# Patient Record
Sex: MALE | Race: WHITE | HISPANIC OR LATINO | Employment: UNEMPLOYED | URBAN - METROPOLITAN AREA
[De-identification: names, ages, dates, MRNs, and addresses within clinical notes are randomized per-mention and may not be internally consistent; named-entity substitution may affect disease eponyms.]

---
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
## 2025-02-13 ENCOUNTER — HOSPITAL ENCOUNTER (INPATIENT)
Facility: HOSPITAL | Age: 20
LOS: 3 days | Discharge: HOME | End: 2025-02-17
Attending: EMERGENCY MEDICINE | Admitting: STUDENT IN AN ORGANIZED HEALTH CARE EDUCATION/TRAINING PROGRAM
Payer: COMMERCIAL

## 2025-02-13 DIAGNOSIS — R42 DIZZINESS: ICD-10-CM

## 2025-02-13 DIAGNOSIS — J93.9 PNEUMOTHORAX ON RIGHT: Primary | ICD-10-CM

## 2025-02-13 DIAGNOSIS — M54.9 BACK PAIN: ICD-10-CM

## 2025-02-13 DIAGNOSIS — R07.9 CHEST PAIN: ICD-10-CM

## 2025-02-13 PROCEDURE — 99285 EMERGENCY DEPT VISIT HI MDM: CPT

## 2025-02-13 PROCEDURE — 93005 ELECTROCARDIOGRAM TRACING: CPT

## 2025-02-13 NOTE — LETTER
[facility] [address]  [address]  [place] [address]  Dept: [phone]    February 17, 2025     Patient: Jayesh Hatfield Jr.   YOB: 2005   Date of Visit: 2/13/2025       To Whom it May Concern:    Jayesh Hatfield is under my professional care. He was seen in the hospital from 2/13/2025 to 02/17/25. He may return to work on 2/19 with the following limitations no heavy lifting greater than 20 pounds for 2 weeks or unless cleared sooner by PCP .    If you have any questions or concerns, please don't hesitate to call.         Sincerely,          Ai Francis MD

## 2025-02-14 ENCOUNTER — APPOINTMENT (EMERGENCY)
Dept: RADIOLOGY | Facility: HOSPITAL | Age: 20
End: 2025-02-14
Payer: COMMERCIAL

## 2025-02-14 PROBLEM — D72.829 LEUKOCYTOSIS: Status: ACTIVE | Noted: 2025-02-14

## 2025-02-14 PROBLEM — J93.83 SPONTANEOUS PNEUMOTHORAX: Status: ACTIVE | Noted: 2025-02-14

## 2025-02-14 PROBLEM — Z72.0 VAPES NICOTINE CONTAINING SUBSTANCE: Status: ACTIVE | Noted: 2025-02-14

## 2025-02-14 PROBLEM — R65.10 SIRS (SYSTEMIC INFLAMMATORY RESPONSE SYNDROME) (HCC): Status: ACTIVE | Noted: 2025-02-14

## 2025-02-14 LAB
ALBUMIN SERPL BCG-MCNC: 5.1 G/DL (ref 3.5–5)
ALP SERPL-CCNC: 88 U/L (ref 34–104)
ALT SERPL W P-5'-P-CCNC: 13 U/L (ref 7–52)
ANION GAP SERPL CALCULATED.3IONS-SCNC: 7 MMOL/L (ref 4–13)
AST SERPL W P-5'-P-CCNC: 17 U/L (ref 13–39)
BASOPHILS # BLD AUTO: 0.04 THOUSANDS/ΜL (ref 0–0.1)
BASOPHILS NFR BLD AUTO: 0 % (ref 0–1)
BILIRUB SERPL-MCNC: 0.51 MG/DL (ref 0.2–1)
BUN SERPL-MCNC: 12 MG/DL (ref 5–25)
CALCIUM SERPL-MCNC: 9.9 MG/DL (ref 8.4–10.2)
CARDIAC TROPONIN I PNL SERPL HS: <2 NG/L (ref ?–50)
CHLORIDE SERPL-SCNC: 102 MMOL/L (ref 96–108)
CO2 SERPL-SCNC: 29 MMOL/L (ref 21–32)
CREAT SERPL-MCNC: 0.79 MG/DL (ref 0.6–1.3)
D DIMER PPP FEU-MCNC: <0.27 UG/ML FEU
EOSINOPHIL # BLD AUTO: 0.07 THOUSAND/ΜL (ref 0–0.61)
EOSINOPHIL NFR BLD AUTO: 1 % (ref 0–6)
ERYTHROCYTE [DISTWIDTH] IN BLOOD BY AUTOMATED COUNT: 11.7 % (ref 11.6–15.1)
GFR SERPL CREATININE-BSD FRML MDRD: 127 ML/MIN/1.73SQ M
GLUCOSE SERPL-MCNC: 90 MG/DL (ref 65–140)
HCT VFR BLD AUTO: 44 % (ref 36.5–49.3)
HGB BLD-MCNC: 15.5 G/DL (ref 12–17)
IMM GRANULOCYTES # BLD AUTO: 0.05 THOUSAND/UL (ref 0–0.2)
IMM GRANULOCYTES NFR BLD AUTO: 0 % (ref 0–2)
LIPASE SERPL-CCNC: 7 U/L (ref 11–82)
LYMPHOCYTES # BLD AUTO: 1.52 THOUSANDS/ΜL (ref 0.6–4.47)
LYMPHOCYTES NFR BLD AUTO: 12 % (ref 14–44)
MCH RBC QN AUTO: 30.7 PG (ref 26.8–34.3)
MCHC RBC AUTO-ENTMCNC: 35.2 G/DL (ref 31.4–37.4)
MCV RBC AUTO: 87 FL (ref 82–98)
MONOCYTES # BLD AUTO: 0.65 THOUSAND/ΜL (ref 0.17–1.22)
MONOCYTES NFR BLD AUTO: 5 % (ref 4–12)
NEUTROPHILS # BLD AUTO: 10.42 THOUSANDS/ΜL (ref 1.85–7.62)
NEUTS SEG NFR BLD AUTO: 82 % (ref 43–75)
NRBC BLD AUTO-RTO: 0 /100 WBCS
PLATELET # BLD AUTO: 187 THOUSANDS/UL (ref 149–390)
PMV BLD AUTO: 10.6 FL (ref 8.9–12.7)
POTASSIUM SERPL-SCNC: 3.5 MMOL/L (ref 3.5–5.3)
PROT SERPL-MCNC: 7.2 G/DL (ref 6.4–8.4)
RBC # BLD AUTO: 5.05 MILLION/UL (ref 3.88–5.62)
SODIUM SERPL-SCNC: 138 MMOL/L (ref 135–147)
WBC # BLD AUTO: 12.75 THOUSAND/UL (ref 4.31–10.16)

## 2025-02-14 PROCEDURE — 83690 ASSAY OF LIPASE: CPT

## 2025-02-14 PROCEDURE — 85025 COMPLETE CBC W/AUTO DIFF WBC: CPT

## 2025-02-14 PROCEDURE — 0W9930Z DRAINAGE OF RIGHT PLEURAL CAVITY WITH DRAINAGE DEVICE, PERCUTANEOUS APPROACH: ICD-10-PCS

## 2025-02-14 PROCEDURE — 99223 1ST HOSP IP/OBS HIGH 75: CPT | Performed by: STUDENT IN AN ORGANIZED HEALTH CARE EDUCATION/TRAINING PROGRAM

## 2025-02-14 PROCEDURE — 96374 THER/PROPH/DIAG INJ IV PUSH: CPT

## 2025-02-14 PROCEDURE — 32551 INSERTION OF CHEST TUBE: CPT | Performed by: EMERGENCY MEDICINE

## 2025-02-14 PROCEDURE — 36415 COLL VENOUS BLD VENIPUNCTURE: CPT

## 2025-02-14 PROCEDURE — 99152 MOD SED SAME PHYS/QHP 5/>YRS: CPT | Performed by: EMERGENCY MEDICINE

## 2025-02-14 PROCEDURE — 99285 EMERGENCY DEPT VISIT HI MDM: CPT | Performed by: EMERGENCY MEDICINE

## 2025-02-14 PROCEDURE — 85379 FIBRIN DEGRADATION QUANT: CPT

## 2025-02-14 PROCEDURE — 84484 ASSAY OF TROPONIN QUANT: CPT

## 2025-02-14 PROCEDURE — 80053 COMPREHEN METABOLIC PANEL: CPT

## 2025-02-14 PROCEDURE — 99232 SBSQ HOSP IP/OBS MODERATE 35: CPT | Performed by: PHYSICIAN ASSISTANT

## 2025-02-14 PROCEDURE — 71045 X-RAY EXAM CHEST 1 VIEW: CPT

## 2025-02-14 PROCEDURE — 99222 1ST HOSP IP/OBS MODERATE 55: CPT | Performed by: THORACIC SURGERY (CARDIOTHORACIC VASCULAR SURGERY)

## 2025-02-14 PROCEDURE — 71046 X-RAY EXAM CHEST 2 VIEWS: CPT

## 2025-02-14 RX ORDER — HYDROMORPHONE HCL/PF 1 MG/ML
0.5 SYRINGE (ML) INJECTION ONCE
Status: COMPLETED | OUTPATIENT
Start: 2025-02-14 | End: 2025-02-14

## 2025-02-14 RX ORDER — OXYCODONE HYDROCHLORIDE 5 MG/1
5 TABLET ORAL EVERY 4 HOURS PRN
Refills: 0 | Status: DISCONTINUED | OUTPATIENT
Start: 2025-02-14 | End: 2025-02-17 | Stop reason: HOSPADM

## 2025-02-14 RX ORDER — LIDOCAINE HYDROCHLORIDE AND EPINEPHRINE 10; 10 MG/ML; UG/ML
1 INJECTION, SOLUTION INFILTRATION; PERINEURAL ONCE
Status: COMPLETED | OUTPATIENT
Start: 2025-02-14 | End: 2025-02-14

## 2025-02-14 RX ORDER — HYDROMORPHONE HCL/PF 1 MG/ML
0.5 SYRINGE (ML) INJECTION ONCE
Refills: 0 | Status: COMPLETED | OUTPATIENT
Start: 2025-02-14 | End: 2025-02-14

## 2025-02-14 RX ORDER — HYDROMORPHONE HCL IN WATER/PF 6 MG/30 ML
0.2 PATIENT CONTROLLED ANALGESIA SYRINGE INTRAVENOUS EVERY 4 HOURS PRN
Status: DISCONTINUED | OUTPATIENT
Start: 2025-02-14 | End: 2025-02-17 | Stop reason: HOSPADM

## 2025-02-14 RX ORDER — KETAMINE HYDROCHLORIDE 50 MG/ML
2 INJECTION, SOLUTION INTRAMUSCULAR; INTRAVENOUS ONCE
Status: COMPLETED | OUTPATIENT
Start: 2025-02-14 | End: 2025-02-14

## 2025-02-14 RX ORDER — HEPARIN SODIUM 5000 [USP'U]/ML
5000 INJECTION, SOLUTION INTRAVENOUS; SUBCUTANEOUS EVERY 8 HOURS SCHEDULED
Status: DISCONTINUED | OUTPATIENT
Start: 2025-02-14 | End: 2025-02-17 | Stop reason: HOSPADM

## 2025-02-14 RX ADMIN — OXYCODONE HYDROCHLORIDE 5 MG: 5 TABLET ORAL at 10:41

## 2025-02-14 RX ADMIN — LIDOCAINE HYDROCHLORIDE,EPINEPHRINE BITARTRATE 1 ML: 10; .01 INJECTION, SOLUTION INFILTRATION; PERINEURAL at 02:45

## 2025-02-14 RX ADMIN — KETAMINE HYDROCHLORIDE 55 MG: 50 INJECTION INTRAMUSCULAR; INTRAVENOUS at 02:42

## 2025-02-14 RX ADMIN — HYDROMORPHONE HYDROCHLORIDE 0.2 MG: 0.2 INJECTION, SOLUTION INTRAMUSCULAR; INTRAVENOUS; SUBCUTANEOUS at 12:10

## 2025-02-14 RX ADMIN — HYDROMORPHONE HYDROCHLORIDE 0.5 MG: 1 INJECTION, SOLUTION INTRAMUSCULAR; INTRAVENOUS; SUBCUTANEOUS at 04:46

## 2025-02-14 RX ADMIN — HYDROMORPHONE HYDROCHLORIDE 0.5 MG: 1 INJECTION, SOLUTION INTRAMUSCULAR; INTRAVENOUS; SUBCUTANEOUS at 03:25

## 2025-02-14 RX ADMIN — HEPARIN SODIUM 5000 UNITS: 5000 INJECTION INTRAVENOUS; SUBCUTANEOUS at 22:00

## 2025-02-14 RX ADMIN — OXYCODONE HYDROCHLORIDE 5 MG: 5 TABLET ORAL at 22:00

## 2025-02-14 RX ADMIN — OXYCODONE HYDROCHLORIDE 5 MG: 5 TABLET ORAL at 15:30

## 2025-02-14 RX ADMIN — HYDROMORPHONE HYDROCHLORIDE 0.2 MG: 0.2 INJECTION, SOLUTION INTRAMUSCULAR; INTRAVENOUS; SUBCUTANEOUS at 18:58

## 2025-02-14 NOTE — PROGRESS NOTES
Progress Note - Hospitalist   Name: Jayesh Hatfield Jr. 20 y.o. male I MRN: [id number]  Unit/Bed#: ED 06 I Date of Admission: 2/13/2025   Date of Service: 2/14/2025 I Hospital Day: 0    Assessment & Plan  Spontaneous pneumothorax  No significant past medical history presenting with right-sided chest pain that radiated to the back around 9:30 PM  Chest x-ray showed right-sided pneumothorax for which successful chest tube was placed by ED with follow-up chest x-ray shows lung reexpansion.  Thoracic surgery following and appreciate their input.  Maintain chest tube to suction for now unless walking in halls.  Possible transition to water seal tomorrow.  Continuous O2 monitor  Vapes nicotine containing substance  Encourage cessation  SIRS (systemic inflammatory response syndrome) (HCC)  As evidenced by tachycardia HR 98, WBC 12.75  Tachycardia resolved.    Check CBC in AM  Likely reactive related to spontaneous pneumothorax  Monitor off antibiotics    VTE Pharmacologic Prophylaxis: VTE Score: 0 Low Risk (Score 0-2) - Encourage Ambulation.    Patient Centered Rounds: I performed bedside rounds with nursing staff today.   Discussions with Specialists or Other Care Team Provider: None    Education and Discussions with Family / Patient: Updated  (mother) at bedside.    Current Length of Stay: 0 day(s)  Current Patient Status: Inpatient   Certification Statement: The patient will continue to require additional inpatient hospital stay due to continued need for chest tube  Discharge Plan: Anticipate discharge in 24-48 hrs to home.    Code Status: Level 1 - Full Code    Subjective   Patient is seen and evaluated in the ED.  He states his breathing is much improved and he barely has any further chest pain with inspiration.      Objective :  Temp:  [98.2 °F (36.8 °C)] 98.2 °F (36.8 °C)  HR:  [55-98] 60  BP: (100-155)/() 129/86  Resp:  [12-22] 13  SpO2:  [86 %-100 %] 100 %  O2 Device: None (Room air)  Nasal Cannula  O2 Flow Rate (L/min):  [2 L/min] 2 L/min    There is no height or weight on file to calculate BMI.     Input and Output Summary (last 24 hours):   No intake or output data in the 24 hours ending 02/14/25 1438    Physical Exam  Constitutional:       General: He is not in acute distress.     Appearance: Normal appearance. He is normal weight.   HENT:      Head: Normocephalic and atraumatic.      Mouth/Throat:      Mouth: Mucous membranes are moist.   Eyes:      General: No scleral icterus.     Extraocular Movements: Extraocular movements intact.   Cardiovascular:      Rate and Rhythm: Normal rate and regular rhythm.      Heart sounds: No murmur heard.  Pulmonary:      Effort: Pulmonary effort is normal. No respiratory distress.      Breath sounds: Normal breath sounds. No wheezing, rhonchi or rales.      Comments: Right-sided chest tube in place.  Abdominal:      General: Bowel sounds are normal.      Palpations: Abdomen is soft.      Tenderness: There is no abdominal tenderness.   Musculoskeletal:         General: Normal range of motion.      Cervical back: Normal range of motion.   Skin:     General: Skin is warm.      Findings: No rash.   Neurological:      General: No focal deficit present.      Mental Status: He is alert and oriented to person, place, and time.   Psychiatric:         Mood and Affect: Mood normal.         Behavior: Behavior normal.       Lines/Drains:  Lines/Drains/Airways       Active Status       Name Placement date Placement time Site Days    Chest Tube 1 Right Midaxillary 16 Fr. 02/14/25  0343  Midaxillary  less than 1                    Lab Results: I have reviewed the following results:   Results from last 7 days   Lab Units 02/14/25  0103   WBC Thousand/uL 12.75*   HEMOGLOBIN g/dL 15.5   HEMATOCRIT % 44.0   PLATELETS Thousands/uL 187   SEGS PCT % 82*   LYMPHO PCT % 12*   MONO PCT % 5   EOS PCT % 1     Results from last 7 days   Lab Units 02/14/25  0103   SODIUM mmol/L 138   POTASSIUM mmol/L  3.5   CHLORIDE mmol/L 102   CO2 mmol/L 29   BUN mg/dL 12   CREATININE mg/dL 0.79   ANION GAP mmol/L 7   CALCIUM mg/dL 9.9   ALBUMIN g/dL 5.1*   TOTAL BILIRUBIN mg/dL 0.51   ALK PHOS U/L 88   ALT U/L 13   AST U/L 17   GLUCOSE RANDOM mg/dL 90     Imaging Results Review: I reviewed radiology reports from this admission including: chest xray.  Other Study Results Review: No additional pertinent studies reviewed.    Last 24 Hours Medication List:     Current Facility-Administered Medications:     HYDROmorphone HCl (DILAUDID) injection 0.2 mg, Q4H PRN    oxyCODONE (ROXICODONE) IR tablet 5 mg, Q4H PRN    oxyCODONE (ROXICODONE) split tablet 2.5 mg, Q4H PRN    Administrative Statements   Today, Patient Was Seen By: Annie Hu PA-C    **Please Note: This note may have been constructed using a voice recognition system.**

## 2025-02-14 NOTE — ASSESSMENT & PLAN NOTE
As evidenced by tachycardia HR 98, WBC 12.75  Likely reactive related to spontaneous pneumothorax  Monitor off antibiotics

## 2025-02-14 NOTE — ASSESSMENT & PLAN NOTE
No significant past medical history presenting with right-sided chest pain that radiated to the back around 9:30 PM  Chest x-ray showed right-sided pneumothorax for which successful chest tube was placed by ED with follow-up chest x-ray shows lung reexpansion.  Thoracic surgery following and appreciate their input.  Maintain chest tube to suction for now unless walking in halls.  Possible transition to water seal tomorrow.  Continuous O2 monitor

## 2025-02-14 NOTE — ED ATTENDING ATTESTATION
2/13/2025  I, Leila Teresa MD, saw and evaluated the patient. I have discussed the patient with the resident/non-physician practitioner and agree with the resident's/non-physician practitioner's findings, Plan of Care, and MDM as documented in the resident's/non-physician practitioner's note, except where noted. All available labs and Radiology studies were reviewed.  I was present for key portions of any procedure(s) performed by the resident/non-physician practitioner and I was immediately available to provide assistance.       At this point I agree with the current assessment done in the Emergency Department.  I have conducted an independent evaluation of this patient a history and physical is as follows:  Right-sided back pain that started suddenly this evening, radiating to back beginning at 9:30 PM.  Much worse with inhalation.  Did have an episode of dizziness but that resolved.  No fevers.  No chills.  No significant cough.  On exam patient with tachycardia.  Chest x-ray with pneumothorax.  Chest tube placed by Seldinger technique.  Will admit to the hospital for spontaneous pneumothorax.  ED Course         Critical Care Time  Procedures

## 2025-02-14 NOTE — ASSESSMENT & PLAN NOTE
No significant past medical history presenting with right-sided chest pain that radiated to the back around 9:30 PM  Chest x-ray showed right-sided pneumothorax for which successful chest tube was placed by ED with follow-up chest x-ray shows lung reexpansion  Unclear etiology at this time  ED discussed with thoracic surgery who will follow as consult  Continuous O2 monitor

## 2025-02-14 NOTE — CONSULTS
Consultation - Thoracic    Name: Jayesh Hatfield Jr. 20 y.o. male I MRN: [id number]  Unit/Bed#: ED 06 I Date of Admission: 2/13/2025   Date of Service: 2/14/2025 I Hospital Day: 0   Inpatient consult to Thoracic Surgery  Consult performed by: Oleg Gonzalez MD  Consult ordered by: Adela Mckeon DO        Physician Requesting Evaluation: Adela Mckeon DO   Reason for Evaluation / Principal Problem: spontaneous R ptx    Assessment & Plan  Spontaneous pneumothorax  -sudden onset R chest pain yesterday at work, no prior episodes, R ptx on CXR and ED placed R CT overnight w resolution of R ptx on subsequent CXR  -R CT to -20 wall suction, small air leak to cough and valsalva, we will keep CT to suction for today, no output  -incentive spirometry, deep breathing and coughing, respiratory protocol  -discussed w patient and mom, will trial treatment w chest tube and when air leak resolves and we pull the tube, if lung is up on CXR and he feels well it would be reasonable to trial non operative management, however if air leak does not resolve, he has continued air leak, or this recurs down the road, will have to pursue R VATS, may require CT imaging before that, will discuss w Dr Felix Bello nicotine containing substance  -has been using vapes daily for 2-3 years  -also states he has a medical marijuana card and smokes         History of Present Illness   Jayesh Hatfield Jr. is a 20 y.o. male w insignificant PMH or surgical hx, no medications. Patient presents to [facility] ED w right chest pain and discomfort, found to have right pneumothorax, R chest tube was placed by ED with resolution of ptx. Thoracic surgery consulted for assistance w management.     Patient reports he was working at the [employer] yesterday morning / afternoon when he has sudden onset of right sided chest pain radiating to his back, especially w deep breathing and inhalation. He reports the pain worsened throughout the course of the day and  ultimately he became lightheaded and dizzy and so he presented to the ED. Denies previous episodes of similar. He does report smoking vapes daily for 2-3 years, also has medical marijuana card and smokes with that.     Review of Systems   Constitutional:  Negative for appetite change, chills and fever.   Respiratory:  Positive for shortness of breath.    Cardiovascular:  Positive for chest pain.   Gastrointestinal:  Negative for abdominal distention, abdominal pain, constipation, diarrhea, nausea and vomiting.   Genitourinary:  Negative for difficulty urinating.   Musculoskeletal:  Positive for back pain. Negative for neck pain.   Skin:  Negative for color change.   Neurological:  Positive for dizziness and light-headedness. Negative for syncope and headaches.   Psychiatric/Behavioral:  Negative for agitation and confusion. The patient is not nervous/anxious.    All other systems reviewed and are negative.    Medical History Review: I have reviewed the patient's PMH, PSH, Social History, Family History, Meds, and Allergies     Objective :  Temp:  [98.2 °F (36.8 °C)] 98.2 °F (36.8 °C)  HR:  [55-98] 76  BP: (100-155)/() 100/73  Resp:  [12-22] 16  SpO2:  [86 %-100 %] 100 %  O2 Device: None (Room air)  Nasal Cannula O2 Flow Rate (L/min):  [2 L/min] 2 L/min    I/O       None          Lines/Drains/Airways       Active Status       Name Placement date Placement time Site Days    Chest Tube 1 Right Midaxillary 16 Fr. 02/14/25  0343  Midaxillary  less than 1                  Physical Exam  Vitals reviewed.   Constitutional:       General: He is not in acute distress.     Appearance: Normal appearance. He is not ill-appearing or toxic-appearing.   HENT:      Head: Normocephalic and atraumatic.      Nose: Nose normal.      Mouth/Throat:      Mouth: Mucous membranes are moist.   Eyes:      Extraocular Movements: Extraocular movements intact.   Cardiovascular:      Rate and Rhythm: Normal rate and regular rhythm.       Pulses: Normal pulses.   Pulmonary:      Effort: Pulmonary effort is normal. No respiratory distress.      Comments: R CT to suction, small air leak to cough and valsalva   Abdominal:      General: There is no distension.      Palpations: Abdomen is soft.      Tenderness: There is no abdominal tenderness.   Musculoskeletal:         General: Normal range of motion.      Cervical back: Normal range of motion.   Skin:     General: Skin is warm.      Capillary Refill: Capillary refill takes less than 2 seconds.      Coloration: Skin is not jaundiced or pale.   Neurological:      Mental Status: He is alert and oriented to person, place, and time.   Psychiatric:         Mood and Affect: Mood normal.            Lab Results: I have reviewed the following results:  Recent Labs     02/14/25  0103 02/14/25  0402   WBC 12.75*  --    HGB 15.5  --    HCT 44.0  --      --    SODIUM 138  --    K 3.5  --      --    CO2 29  --    BUN 12  --    CREATININE 0.79  --    GLUC 90  --    AST 17  --    ALT 13  --    ALB 5.1*  --    TBILI 0.51  --    ALKPHOS 88  --    HSTNI0 <2  --    HSTNI2  --  <2

## 2025-02-14 NOTE — ASSESSMENT & PLAN NOTE
-has been using vapes daily for 2-3 years  -also states he has a medical marijuana card and smokes

## 2025-02-14 NOTE — ED PROVIDER NOTES
Time reflects when diagnosis was documented in both MDM as applicable and the Disposition within this note       Time User Action Codes Description Comment    2/14/2025  1:16 AM Jacques Duarte Add [R07.9] Chest pain     2/14/2025  1:16 AM Jacques Duarte Add [M54.9] Back pain     2/14/2025  1:16 AM Jacques Duarte Add [R42] Dizziness     2/14/2025  3:26 AM Jacques Duarte Add [J93.9] Pneumothorax on right     2/14/2025  3:26 AM Jacques Duarte Modify [R07.9] Chest pain     2/14/2025  3:26 AM Jacques Duarte Modify [J93.9] Pneumothorax on right           ED Disposition       ED Disposition   Admit    Condition   Stable    Date/Time   Fri Feb 14, 2025  3:26 AM    Comment   --             Assessment & Plan       Medical Decision Making  Amount and/or Complexity of Data Reviewed  Labs: ordered. Decision-making details documented in ED Course.  Radiology: ordered and independent interpretation performed.  ECG/medicine tests:  Decision-making details documented in ED Course.    Risk  Prescription drug management.  Decision regarding hospitalization.      Patient is a 20 y.o. male with a past medical history of asthma presenting for right-sided chest pain that radiates straight through to the back that began around 9:30 PM with an episode of dizziness around 10 PM.      Vital signs stable. On exam decreased breath sounds in the right lung fields, chest tenderness is unable to be reproduced with palpation.    History and physical exam most consistent with right-sided pneumothorax. However, differential diagnosis included but not limited to acute coronary syndrome, pulmonary embolism, pneumonia, anemia, electrolyte abnormality, acute kidney injury, pancreatitis, arrhythmia.     Plan: CBC, CMP, troponin, D-dimer, EKG, CXR    Initial CXR showed a moderate to large sized spontaneous pneumothorax, no tracheal deviation.  Decision was made to consent patient for procedural sedation and chest tube insertion to relieve the air in the thoracic  "cavity that is compressing the lung.    View ED course for further discussion on patient workup.    All labs reviewed and utilized in the medical decision making process  All radiology studies independently viewed by me and interpreted by the radiologist.  I reviewed all testing with the patient.     Upon re-evaluation patient tolerated procedure well with no immediate postprocedure complications.  Repeat chest x-ray shows reexpansion of the right lung with chest tube in place.  Patient had 1 brief episode of hypoxia during procedure and recovered quickly with bag valve mask ventilation.  Patient mildly hypoxic at 93% on room air after the procedure; placed on 2 L nasal cannula. The cause of patient's spontaneous pneumothorax is unclear.  According to patient's social history, he is a vape user which increases his risk of pneumothorax.  Patient's pneumothorax is unlikely traumatic as there is no reported recent trauma and no tracheal deviation on CXR.  Patient also states that he recently had a viral URI or possibly a pneumonia as he had described recent cough and congestion symptoms for which he has been feeling better lately.  Asthma is also a risk factor for pneumothorax; it is possible that patient's recent illness of cough and congestion may have exacerbated his asthma; thereby increasing his risk.  It does not appear patient has a history of pneumothorax.  Cause is unknown.  Will admit patient to [facility].    Disposition: Admitted to [facility] for right-sided pneumothorax with chest tube placement and thoracic surgery consult.    Portions of the record may have been created with voice recognition software. Occasional wrong word or \"sound a like\" substitutions may have occurred due to the inherent limitations of voice recognition software. Read the chart carefully and recognize, using context, where substitutions have occurred.    ED Course as of 02/14/25 1446   Fri Feb 14, 2025   0057 ECG 12 lead  Rapid rate, regular " rhythm, normal axis.  P waves present.  No obvious ST segment abnormalities.  Possible left ventricular hypertrophy given amplitude of QRS complexes in some leads.  EKG interpreted as sinus tachycardia.   0117 WBC(!): 12.75   0148 Consents obtained for chest tube placement and procedural sedation at this time.   0157 D-Dimer, Quant: <0.27  Lower suspicion for pulmonary embolism   0157 LIPASE(!): 7  Lower suspicion for pancreatitis   0158 hs TnI 0hr: <2   0158 Comprehensive metabolic panel(!)  Stable   0500 hs TnI 2hr: <2  Low suspicion for acute coronary syndrome       Medications   ketamine (Ketalar) injection 110 mg (55 mg Intravenous Given 2/14/25 0242)   lidocaine-epinephrine (XYLOCAINE/EPINEPHRINE) 1 %-1:100,000 injection 1 mL (1 mL Infiltration Given by Other 2/14/25 0245)   HYDROmorphone (DILAUDID) injection 0.5 mg (0.5 mg Intravenous Given 2/14/25 0325)       ED Risk Strat Scores            HEART Risk Score      Flowsheet Row Most Recent Value   Heart Score Risk Calculator    History 0 Filed at: 02/14/2025 0501   ECG 0 Filed at: 02/14/2025 0501   Age 0 Filed at: 02/14/2025 0501   Risk Factors 0 Filed at: 02/14/2025 0501   Troponin 0 Filed at: 02/14/2025 0501   HEART Score 0 Filed at: 02/14/2025 0501                                             History of Present Illness       Chief Complaint   Patient presents with    Chest Pain     Pt. C/o left sided chest pain during inspiration that started today around 1500.        Past Medical History:   Diagnosis Date    Pneumonia     Trichophytosis 10/2010    R PINNA    Vitamin D deficiency     resolved 9/15/17      Past Surgical History:   Procedure Laterality Date    CIRCUMCISION      elective      Family History   Problem Relation Age of Onset    Gestational diabetes Mother     Asthma Father     Diabetes Maternal Grandmother     Hypertension Maternal Grandmother     Hyperlipidemia Maternal Grandmother     Diabetes Paternal Grandmother     Hypertension Paternal  Grandmother     Asthma Paternal Grandfather     Diabetes Paternal Grandfather     Asthma Other     Asthma Cousin       Social History     Tobacco Use    Smoking status: Never    Smokeless tobacco: Never      E-Cigarette/Vaping      E-Cigarette/Vaping Substances      I have reviewed and agree with the history as documented.       Chest Pain  Associated symptoms: back pain and dizziness      Patient is a 20-year-old male with a past medical history of asthma presenting for low right-sided chest pain that radiates straight through to the back that began around 9:30 PM.  Patient states that he does not experience the chest pain at rest but it presents itself when he inhales and seems to travel straight through to his back.  He also experienced an episode of dizziness around 10 PM but did not syncopize.  Patient states he recently had a viral URI which he is just now starting to feel better.  Patient denies family history of sudden cardiac death, fevers, chills, shortness of breath, hemoptysis, nausea, vomiting, constipation, and diarrhea.    Review of Systems   Cardiovascular:  Positive for chest pain.   Musculoskeletal:  Positive for back pain.   Neurological:  Positive for dizziness.   All other systems reviewed and are negative.          Objective       ED Triage Vitals [02/13/25 2309]   Temperature Pulse Blood Pressure Respirations SpO2 Patient Position - Orthostatic VS   98.2 °F (36.8 °C) 98 116/68 18 99 % Sitting      Temp Source Heart Rate Source BP Location FiO2 (%) Pain Score    Temporal Monitor Left arm -- 5      Vitals      Date and Time Temp Pulse SpO2 Resp BP Pain Score FACES Pain Rating User   02/14/25 0320 -- 75 95 % 22 140/102 10 - Worst Possible Pain -- AS   02/14/25 0315 -- 73 97 % 14 136/90 10 - Worst Possible Pain -- AS   02/14/25 0310 -- 63 100 % 15 145/85 -- -- AS   02/14/25 0305 -- 63 100 % 19 155/96 -- -- AS   02/14/25 0300 -- 65 97 % 14 145/97 -- -- AS   02/14/25 0255 -- 88 95 % 17 154/98 -- --  AS   02/14/25 0250 -- 96 100 % 14 130/75 -- -- AS   02/14/25 0245 -- 90 86 % 12 131/85 -- -- AS   02/14/25 0240 -- 76 100 % 19 114/59 -- -- AS   02/14/25 0225 -- 81 100 % 13 119/62 -- -- AS   02/14/25 0215 -- 76 100 % 16 132/73 -- -- AS   02/13/25 2309 98.2 °F (36.8 °C) 98 99 % 18 116/68 5 -- BK            Physical Exam  Vitals and nursing note reviewed.   Constitutional:       General: He is not in acute distress.     Appearance: Normal appearance. He is well-developed. He is not ill-appearing or toxic-appearing.   HENT:      Head: Normocephalic and atraumatic.   Eyes:      General: No scleral icterus.        Right eye: No discharge.         Left eye: No discharge.      Extraocular Movements: Extraocular movements intact.      Conjunctiva/sclera: Conjunctivae normal.      Pupils: Pupils are equal, round, and reactive to light.   Cardiovascular:      Rate and Rhythm: Normal rate and regular rhythm.      Pulses: Normal pulses.      Heart sounds: Normal heart sounds. No murmur heard.  Pulmonary:      Effort: Pulmonary effort is normal. No respiratory distress.      Breath sounds: No stridor. Examination of the right-upper field reveals decreased breath sounds. Examination of the right-lower field reveals decreased breath sounds. Decreased breath sounds present. No wheezing, rhonchi or rales.   Chest:      Chest wall: No tenderness.   Abdominal:      General: Bowel sounds are normal. There is no distension.      Palpations: Abdomen is soft.      Tenderness: There is no abdominal tenderness. There is no right CVA tenderness, left CVA tenderness, guarding or rebound. Negative signs include Spencer's sign and McBurney's sign.   Musculoskeletal:         General: No swelling.      Cervical back: Normal range of motion and neck supple.      Thoracic back: No tenderness.      Right lower leg: No edema.      Left lower leg: No edema.   Skin:     General: Skin is warm and dry.      Capillary Refill: Capillary refill takes less  than 2 seconds.      Coloration: Skin is not jaundiced.      Findings: No erythema.   Neurological:      General: No focal deficit present.      Mental Status: He is alert and oriented to person, place, and time.      Cranial Nerves: Cranial nerves 2-12 are intact. No cranial nerve deficit.      Sensory: Sensation is intact. No sensory deficit.      Motor: Motor function is intact. No weakness.   Psychiatric:         Mood and Affect: Mood normal.         Behavior: Behavior normal.         Thought Content: Thought content normal.         Judgment: Judgment normal.         Results Reviewed       Procedure Component Value Units Date/Time    HS Troponin I 2hr [[id number]] Collected: 02/14/25 0402    Lab Status: In process Specimen: Blood from Arm, Left Updated: 02/14/25 0405    HS Troponin I 4hr [[id number]]     Lab Status: No result Specimen: Blood     Comprehensive metabolic panel [[id number]]  (Abnormal) Collected: 02/14/25 0103    Lab Status: Final result Specimen: Blood from Arm, Right Updated: 02/14/25 0133     Sodium 138 mmol/L      Potassium 3.5 mmol/L      Chloride 102 mmol/L      CO2 29 mmol/L      ANION GAP 7 mmol/L      BUN 12 mg/dL      Creatinine 0.79 mg/dL      Glucose 90 mg/dL      Calcium 9.9 mg/dL      AST 17 U/L      ALT 13 U/L      Alkaline Phosphatase 88 U/L      Total Protein 7.2 g/dL      Albumin 5.1 g/dL      Total Bilirubin 0.51 mg/dL      eGFR 127 ml/min/1.73sq m     Narrative:      National Kid[employer] guidelines for Chronic Kidney Disease (CKD):     Stage 1 with normal or high GFR (GFR > 90 mL/min/1.73 square meters)    Stage 2 Mild CKD (GFR = 60-89 mL/min/1.73 square meters)    Stage 3A Moderate CKD (GFR = 45-59 mL/min/1.73 square meters)    Stage 3B Moderate CKD (GFR = 30-44 mL/min/1.73 square meters)    Stage 4 Severe CKD (GFR = 15-29 mL/min/1.73 square meters)    Stage 5 End Stage CKD (GFR <15 mL/min/1.73 square meters)  Note: GFR calculation is accurate only with a steady  state creatinine    Lipase [[id number]]  (Abnormal) Collected: 02/14/25 0103    Lab Status: Final result Specimen: Blood from Arm, Right Updated: 02/14/25 0133     Lipase 7 u/L     HS Troponin 0hr (reflex protocol) [[id number]]  (Normal) Collected: 02/14/25 0103    Lab Status: Final result Specimen: Blood from Arm, Right Updated: 02/14/25 0131     hs TnI 0hr <2 ng/L     D-Dimer [[id number]]  (Normal) Collected: 02/14/25 0103    Lab Status: Final result Specimen: Blood from Arm, Right Updated: 02/14/25 0130     D-Dimer, Quant <0.27 ug/ml FEU     CBC and differential [[id number]]  (Abnormal) Collected: 02/14/25 0103    Lab Status: Final result Specimen: Blood from Arm, Right Updated: 02/14/25 0113     WBC 12.75 Thousand/uL      RBC 5.05 Million/uL      Hemoglobin 15.5 g/dL      Hematocrit 44.0 %      MCV 87 fL      MCH 30.7 pg      MCHC 35.2 g/dL      RDW 11.7 %      MPV 10.6 fL      Platelets 187 Thousands/uL      nRBC 0 /100 WBCs      Segmented % 82 %      Immature Grans % 0 %      Lymphocytes % 12 %      Monocytes % 5 %      Eosinophils Relative 1 %      Basophils Relative 0 %      Absolute Neutrophils 10.42 Thousands/µL      Absolute Immature Grans 0.05 Thousand/uL      Absolute Lymphocytes 1.52 Thousands/µL      Absolute Monocytes 0.65 Thousand/µL      Eosinophils Absolute 0.07 Thousand/µL      Basophils Absolute 0.04 Thousands/µL             XR chest 2 views   ED Interpretation by Jacques Duarte DO (02/14 0129)   Right-sided pneumothorax      XR chest 1 view portable    (Results Pending)       Chest Tube    Date/Time: 2/14/2025 3:33 AM    Performed by: Jacques Duarte DO  Authorized by: Jacques Duarte DO    Patient location:  ED  Other Assisting Provider: Yes (comment) (ANGEL Lei MD; Leanne Luther DO; Leila Teresa MD)    Consent:     Consent obtained:  Written    Consent given by:  Patient    Risks discussed:  Bleeding, damage to surrounding structures, infection, pain and incomplete drainage     Alternatives discussed:  No treatment and delayed treatment  Universal protocol:     Procedure explained and questions answered to patient or proxy's satisfaction: yes      Relevant documents present and verified: yes      Test results available and properly labeled: yes      Radiology Images displayed and confirmed.  If images not available, report reviewed: yes      Required blood products, implants, devices, and special equipment available: yes      Site/side marked: yes      Immediately prior to procedure a time out was called: yes      Patient identity confirmed:  Verbally with patient  Pre-procedure details:     Skin preparation:  ChloraPrep    Preparation: Patient was prepped and draped in the usual sterile fashion    Indications:     Indications: pneumothroax    Sedation:     Sedation type:  Moderate (conscious) sedation (See separate Procedural Sedation form)  Anesthesia (see MAR for exact dosages):     Anesthesia method:  Local infiltration    Local anesthetic:  Lidocaine 1% WITH epi  Procedure details:     Placement location:  Lateral    Laterality:  Right    Approach:  Percutaneous    Scalpel size:  11    Angiocath size:  18G x 1 1/4    Thal-Quick Chest Tube Kit:  16 Fr    Tube size (Fr):  16    Ultrasound guidance: no      Tension pneumothorax: no      Needle Decompression: no      Tube connected to:  Suction    Drainage characteristics:  Air only    Suture material: 3-0.    Dressing:  Xeroform gauze  Post-procedure details:     Post-insertion x-ray findings: tube in good position      Patient tolerance of procedure:  Tolerated well, no immediate complications      ED Medication and Procedure Management   Prior to Admission Medications   Prescriptions Last Dose Informant Patient Reported? Taking?   Cholecalciferol (VITAMIN D PO)  Mother Yes No   Sig: Take by mouth   Pseudoeph-Doxylamine-DM-APAP (NYQUIL PO)  Mother Yes No   Sig: Take by mouth   Pseudoephedrine-APAP-DM (DAYQUIL PO)  Mother Yes No   Sig:  Take by mouth      Facility-Administered Medications: None     Patient's Medications   Discharge Prescriptions    No medications on file     No discharge procedures on file.  ED SEPSIS DOCUMENTATION   Time reflects when diagnosis was documented in both MDM as applicable and the Disposition within this note       Time User Action Codes Description Comment    2/14/2025  1:16 AM Jacques Duarte [R07.9] Chest pain     2/14/2025  1:16 AM Jacques Duarte [M54.9] Back pain     2/14/2025  1:16 AM Jacques Duarte [R42] Dizziness     2/14/2025  3:26 AM Jacques Duarte Add [J93.9] Pneumothorax on right     2/14/2025  3:26 AM Jacques Duarte Modify [R07.9] Chest pain     2/14/2025  3:26 AM Jacques Duarte Modify [J93.9] Pneumothorax on right                  Jacques Duarte, DO  02/14/25 1448       Jacques Duarte, DO  02/14/25 2012       Jacques Duarte, DO  02/15/25 1001

## 2025-02-14 NOTE — ED PROCEDURE NOTE
Procedure  Pre-Procedural Sedation    Performed by: Leanne Rolle DO  Authorized by: Leanne Rolle DO    Consent:     Consent obtained:  Written    Consent given by:  Patient    Risks discussed:  Allergic reaction, dysrhythmia, prolonged hypoxia resulting in organ damage, prolonged sedation necessitating reversal, respiratory compromise necessitating ventilatory assistance and intubation, inadequate sedation, nausea and vomiting    Alternatives discussed:  Analgesia without sedation, anxiolysis and regional anesthesia  Raymond protocol:     Procedure explained and questions answered to patient or proxy's satisfaction: yes      Relevant documents present and verified: yes      Test results available and properly labeled: yes      Radiology Images displayed and confirmed.  If images not available, report reviewed: yes      Required blood products, implants, devices, and special equipment available: yes      Site/side marked: yes      Immediately prior to procedure a time out was called: yes      Patient identity confirmation method:  Verbally with patient, hospital-assigned identification number and arm band  Indications:     Sedation purpose:  Chest tube placement    Procedure necessitating sedation performed by:  Different physician    Intended level of sedation:  Moderate (conscious sedation)  Pre-sedation assessment:     NPO status caution: urgency dictates proceeding with non-ideal NPO status      ASA classification: class 1 - normal, healthy patient      Neck mobility: normal      Mouth opening:  3 or more finger widths    Thyromental distance:  4 finger widths    Mallampati score:  I - soft palate, uvula, fauces, pillars visible    Pre-sedation assessments completed and reviewed: airway patency, cardiovascular function, hydration status, mental status, nausea/vomiting, pain level and respiratory function      History of difficult intubation: no      Pre-sedation assessment completed:   2/14/2025 2:40 AM  Procedural Sedation    Date/Time: 2/14/2025 2:42 AM    Performed by: Leanne Rolle DO  Authorized by: Leanne Rolle DO    Immediate pre-procedure details:     Reassessment: Patient reassessed immediately prior to procedure      Reviewed: vital signs, relevant labs/tests and NPO status      Verified: bag valve mask available, emergency equipment available, intubation equipment available, IV patency confirmed, oxygen available and suction available    Procedure details (see MAR for exact dosages):     Sedation start time:  2/14/2025 2:42 AM    Preoxygenation:  Nasal cannula    Sedation:  Ketamine    Analgesia:  None    Intra-procedure monitoring:  Blood pressure monitoring, cardiac monitor, continuous pulse oximetry, continuous capnometry, frequent LOC assessments and frequent vital sign checks    Intra-procedure events: hypoxia and respiratory depression      Intra-procedure management:  Airway repositioning and BVM ventilation    Sedation end time:  2/14/2025 3:10 AM    Total sedation time (minutes):  28  Post-procedure details:     Post-sedation assessment completed:  2/14/2025 3:20 AM    Attendance: Constant attendance by certified staff until patient recovered      Recovery: Patient returned to pre-procedure baseline      Post-sedation assessments completed and reviewed: airway patency, cardiovascular function, hydration status, mental status, nausea/vomiting, pain level and respiratory function      Patient is stable for discharge or admission: yes      Patient tolerance:  Tolerated well, no immediate complications                   Leanne Rolle DO  02/14/25 0652

## 2025-02-14 NOTE — ASSESSMENT & PLAN NOTE
-sudden onset R chest pain yesterday at work, no prior episodes, R ptx on CXR and ED placed R CT overnight w resolution of R ptx on subsequent CXR  -R CT to -20 wall suction, small air leak to cough and valsalva, we will keep CT to suction for today, no output  -incentive spirometry, deep breathing and coughing, respiratory protocol  -discussed w patient and mom, will trial treatment w chest tube and when air leak resolves and we pull the tube, if lung is up on CXR and he feels well it would be reasonable to trial non operative management, however if air leak does not resolve, he has continued air leak, or this recurs down the road, will have to pursue R VATS, may require CT imaging before that, will discuss w Dr Washington

## 2025-02-14 NOTE — H&P
H&P - Hospitalist   Name: Jayesh Hatfield Jr. 20 y.o. male I MRN: [id number]  Unit/Bed#: ED 06 I Date of Admission: 2/13/2025   Date of Service: 2/14/2025 I Hospital Day: 0     Assessment & Plan  Spontaneous pneumothorax  No significant past medical history presenting with right-sided chest pain that radiated to the back around 9:30 PM  Chest x-ray showed right-sided pneumothorax for which successful chest tube was placed by ED with follow-up chest x-ray shows lung reexpansion  Unclear etiology at this time  ED discussed with thoracic surgery who will follow as consult  Continuous O2 monitor  Vapes nicotine containing substance  Encourage cessation  SIRS (systemic inflammatory response syndrome) (HCC)  As evidenced by tachycardia HR 98, WBC 12.75  Likely reactive related to spontaneous pneumothorax  Monitor off antibiotics      VTE Pharmacologic Prophylaxis: VTE Score: 0 Low Risk (Score 0-2) - Encourage Ambulation.  Code Status: Level 1 - Full Code   Discussion with family: Updated  (mother) at bedside.    Anticipated Length of Stay: Patient will be admitted on an inpatient basis with an anticipated length of stay of greater than 2 midnights secondary to pneumothorax requiring thoracic surgery evaluation.    History of Present Illness   Chief Complaint: Chest pain    Jayesh Hatfield Jr. is a 20 y.o. male with a PMH of anxiety, use of vape who presents with acute chest pain on the right side with radiation to the back that started around 9:30 PM.  Workup in the ED concerning for right-sided pneumothorax with successful chest tube placement with repeat chest x-ray showing lung reexpansion.  Vital signs stable and patient is currently saturating well on room air.  Labs significant for leukocytosis.  Troponin negative.  D-dimer negative.  Initial EKG showing sinus tachycardia.  Discussed with thoracic surgery who will evaluate patient.    Review of Systems   Respiratory:  Positive for shortness of breath.     Cardiovascular:  Positive for chest pain.   All other systems reviewed and are negative.      Historical Information   Past Medical History:   Diagnosis Date    Pneumonia     Trichophytosis 10/2010    R PINNA    Vitamin D deficiency     resolved 9/15/17     Past Surgical History:   Procedure Laterality Date    CIRCUMCISION      elective     Social History     Tobacco Use    Smoking status: Never    Smokeless tobacco: Never   Substance and Sexual Activity    Alcohol use: Not on file    Drug use: Not on file    Sexual activity: Not on file     E-Cigarette/Vaping     E-Cigarette/Vaping Substances     Family history non-contributory  Social History:  Marital Status: Single       Meds/Allergies   I have reviewed home medications with patient personally.  Prior to Admission medications    Medication Sig Start Date End Date Taking? Authorizing Provider   Cholecalciferol (VITAMIN D PO) Take by mouth    Historical Provider, MD   Pseudoeph-Doxylamine-DM-APAP (NYQUIL PO) Take by mouth    Historical Provider, MD   Pseudoephedrine-APAP-DM (DAYQUIL PO) Take by mouth    Historical Provider, MD     No Known Allergies    Objective :  Temp:  [98.2 °F (36.8 °C)] 98.2 °F (36.8 °C)  HR:  [63-98] 75  BP: (114-155)/() 140/102  Resp:  [12-22] 22  SpO2:  [86 %-100 %] 95 %  O2 Device: None (Room air)  Nasal Cannula O2 Flow Rate (L/min):  [2 L/min] 2 L/min    Physical Exam  Vitals reviewed.   Constitutional:       General: He is in acute distress (Secondary to pain).      Appearance: Normal appearance. He is not ill-appearing.   HENT:      Head: Normocephalic and atraumatic.   Cardiovascular:      Rate and Rhythm: Normal rate and regular rhythm.      Heart sounds: Normal heart sounds.   Pulmonary:      Effort: Pulmonary effort is normal. No respiratory distress.      Breath sounds: No wheezing or rales.      Comments: Right chest tube in place  Abdominal:      General: Bowel sounds are normal.      Tenderness: There is no abdominal  "tenderness.   Musculoskeletal:      Right lower leg: No edema.      Left lower leg: No edema.   Neurological:      Mental Status: He is alert and oriented to person, place, and time. Mental status is at baseline.          Lines/Drains:  Lines/Drains/Airways       Active Status       Name Placement date Placement time Site Days    Chest Tube 1 Right Midaxillary 16 Fr. 02/14/25  0343  Midaxillary  less than 1                          Lab Results: I have reviewed the following results:  Results from last 7 days   Lab Units 02/14/25  0103   WBC Thousand/uL 12.75*   HEMOGLOBIN g/dL 15.5   HEMATOCRIT % 44.0   PLATELETS Thousands/uL 187   SEGS PCT % 82*   LYMPHO PCT % 12*   MONO PCT % 5   EOS PCT % 1     Results from last 7 days   Lab Units 02/14/25  0103   SODIUM mmol/L 138   POTASSIUM mmol/L 3.5   CHLORIDE mmol/L 102   CO2 mmol/L 29   BUN mg/dL 12   CREATININE mg/dL 0.79   ANION GAP mmol/L 7   CALCIUM mg/dL 9.9   ALBUMIN g/dL 5.1*   TOTAL BILIRUBIN mg/dL 0.51   ALK PHOS U/L 88   ALT U/L 13   AST U/L 17   GLUCOSE RANDOM mg/dL 90             No results found for: \"HGBA1C\"        Imaging Results Review: I reviewed radiology reports from this admission including: chest xray.  Other Study Results Review: EKG was reviewed.  EKG was personally reviewed and my interpretation is: NSR. ..    Administrative Statements   I have spent a total time of 65 minutes in caring for this patient on the day of the visit/encounter including Counseling / Coordination of care, Documenting in the medical record, Reviewing / ordering tests, medicine, procedures  , Obtaining or reviewing history  , and Communicating with other healthcare professionals .    ** Please Note: This note has been constructed using a voice recognition system. **    "

## 2025-02-14 NOTE — ASSESSMENT & PLAN NOTE
As evidenced by tachycardia HR 98, WBC 12.75  Tachycardia resolved.    Check CBC in AM  Likely reactive related to spontaneous pneumothorax  Monitor off antibiotics

## 2025-02-14 NOTE — APP STUDENT NOTE
[facility] STUDENT  Inpatient Progress Note for TRAINING ONLY  Not Part of Legal Medical Record     Progress Note - Jayesh Hatfield Jr. 20 y.o. male MRN: [id number]  Unit/Bed#: ED 06 Encounter: [id number]    Assessment:    Principal Problem:    Spontaneous pneumothorax  Active Problems:    Vapes nicotine containing substance    SIRS (systemic inflammatory response syndrome) (HCC)      Plan:    Spontaneous pneumothorax  Presented to the ED c/o acute chest pain the R, worse with inspiration and radiation to the back. CXR showed right sided pneumothorax  Etiology unclear  S/p chest tube placement in ED  Repeat CXR showed lung reexpansion  Continuous SpO2 monitoring  Currently on 2 L NC with SpO2 in the high 90s to 100%  Appreciate thoracic surgery consult  Will keep chest tube to suction today. Hope to clamp tube in 24-48 hours if no air leaks, defer to thoracic surgery  Incentive spirometry, deep breathing, and respiratory protocol  Pain poorly controlled at bedside s/p 2 doses IV Dilaudid early AM. Monitor pain after oxycodone 5 mg  May adjust to 5 mg for moderate pain and 10 mg for severe pain if pain still poorly controlled    SIRS (systematic inflammatory response syndrome)  On arrival met SIRS criteria  WBC 12.75, HR 98  Likely reactive to pneumothorax  Monitor CBC tomorrow AM  Monitor VS routinely, normal rate ~60s overnight      VTE Pharmacologic Prophylaxis:   Pharmacologic: Pharmacologic VTE Prophylaxis contraindicated due to chest tube placement  Mechanical VTE Prophylaxis in Place: Yes    Patient Centered Rounds: {Patient Centered Rounds:74660}    Discussions with Specialists or Other Care Team Provider: ***    Education and Discussions with Family / Patient: ***    Time Spent for Care: 15 minutes.  More than 50% of total time spent on counseling and coordination of care as described above.    Current Length of Stay: 0 day(s)    Current Patient Status: Inpatient   Certification Statement: The patient will continue  to require additional inpatient hospital stay due to chest tube, thoracic surgery consult    Discharge Plan: Discharge in 48-72 hours to home    Code Status: Level 1 - Full Code    Subjective:   Patient seen and examined at bedside. Patient is in distress secondary to moderate 6-7/10 pain. States that is is having moderate trouble breathing with associated chest and back pain. Patient was able to tolerate a small amount of food this morning but is not hungry. Talked with thoracic surgery about admitting to watch chest tube.    Objective:     Vitals:   Temp (24hrs), Av.2 °F (36.8 °C), Min:98.2 °F (36.8 °C), Max:98.2 °F (36.8 °C)    Temp:  [98.2 °F (36.8 °C)] 98.2 °F (36.8 °C)  HR:  [55-98] 63  Resp:  [12-22] 16  BP: (114-155)/() 130/73  SpO2:  [86 %-100 %] 100 %  There is no height or weight on file to calculate BMI.     Input and Output Summary (last 24 hours):     No intake or output data in the 24 hours ending 25 0829    Physical Exam:     Physical Exam  Vitals and nursing note reviewed.   Constitutional:       General: He is not in acute distress.     Appearance: He is well-developed.   HENT:      Head: Normocephalic and atraumatic.   Eyes:      Conjunctiva/sclera: Conjunctivae normal.   Cardiovascular:      Rate and Rhythm: Normal rate and regular rhythm.      Heart sounds: No murmur heard.  Pulmonary:      Effort: Pulmonary effort is normal.      Breath sounds: Normal breath sounds.      Comments: R sided chest tube placement utilizing 2 L NC  Chest:      Chest wall: Tenderness present.   Abdominal:      General: Abdomen is flat.      Palpations: Abdomen is soft.      Tenderness: There is no abdominal tenderness.   Musculoskeletal:         General: No swelling.      Cervical back: Neck supple.   Skin:     General: Skin is warm and dry.      Capillary Refill: Capillary refill takes less than 2 seconds.   Neurological:      Mental Status: He is alert.   Psychiatric:         Mood and Affect: Mood  normal.         Historical Information   Past Medical History:   Diagnosis Date    Pneumonia     Trichophytosis 10/2010    R ERIN    Vitamin D deficiency     resolved 9/15/17     Past Surgical History:   Procedure Laterality Date    CIRCUMCISION      elective     Social History   Social History     Substance and Sexual Activity   Alcohol Use None     Social History     Substance and Sexual Activity   Drug Use Not on file     Social History     Tobacco Use   Smoking Status Never   Smokeless Tobacco Never     Family History:   Family History   Problem Relation Age of Onset    Gestational diabetes Mother     Asthma Father     Diabetes Maternal Grandmother     Hypertension Maternal Grandmother     Hyperlipidemia Maternal Grandmother     Diabetes Paternal Grandmother     Hypertension Paternal Grandmother     Asthma Paternal Grandfather     Diabetes Paternal Grandfather     Asthma Other     Asthma Cousin        Meds/Allergies   PTA meds:   Prior to Admission Medications   Prescriptions Last Dose Informant Patient Reported? Taking?   Cholecalciferol (VITAMIN D PO)  Mother Yes No   Sig: Take by mouth   Pseudoeph-Doxylamine-DM-APAP (NYQUIL PO)  Mother Yes No   Sig: Take by mouth   Pseudoephedrine-APAP-DM (DAYQUIL PO)  Mother Yes No   Sig: Take by mouth      Facility-Administered Medications: None     No Known Allergies    Additional Data:     Labs:    Results from last 7 days   Lab Units 02/14/25  0103   WBC Thousand/uL 12.75*   HEMOGLOBIN g/dL 15.5   HEMATOCRIT % 44.0   PLATELETS Thousands/uL 187   SEGS PCT % 82*   LYMPHO PCT % 12*   MONO PCT % 5   EOS PCT % 1     Results from last 7 days   Lab Units 02/14/25  0103   SODIUM mmol/L 138   POTASSIUM mmol/L 3.5   CHLORIDE mmol/L 102   CO2 mmol/L 29   BUN mg/dL 12   CREATININE mg/dL 0.79   ANION GAP mmol/L 7   CALCIUM mg/dL 9.9   ALBUMIN g/dL 5.1*   TOTAL BILIRUBIN mg/dL 0.51   ALK PHOS U/L 88   ALT U/L 13   AST U/L 17   GLUCOSE RANDOM mg/dL 90                         * I Have  "Reviewed All Lab Data Listed Above.  * Additional Pertinent Lab Tests Reviewed: All Labs For Current Hospital Admission Reviewed    Imaging:    Imaging Reports Reviewed Today Include: CXR lateral pre and post chest tube insertion  Imaging Personally Reviewed by Myself Includes:  \" \"    Recent Cultures (last 7 days):           Last 24 Hours Medication List:   Current Facility-Administered Medications   Medication Dose Route Frequency Provider Last Rate    HYDROmorphone  0.2 mg Intravenous Q4H PRN Harnishkumar Mckeon, DO      oxyCODONE  5 mg Oral Q4H PRN Harnishkumar Mckeon, DO      oxyCODONE  2.5 mg Oral Q4H PRN Harnishkumar Mckeon, DO          Today, Patient Was Seen By: Keshia Rowe    ** Please Note: Dictation voice to text software may have been used in the creation of this document. **   "

## 2025-02-15 LAB
ATRIAL RATE: 101 BPM
BASOPHILS # BLD AUTO: 0.03 THOUSANDS/ΜL (ref 0–0.1)
BASOPHILS NFR BLD AUTO: 0 % (ref 0–1)
EOSINOPHIL # BLD AUTO: 0.2 THOUSAND/ΜL (ref 0–0.61)
EOSINOPHIL NFR BLD AUTO: 3 % (ref 0–6)
ERYTHROCYTE [DISTWIDTH] IN BLOOD BY AUTOMATED COUNT: 11.9 % (ref 11.6–15.1)
HCT VFR BLD AUTO: 44.4 % (ref 36.5–49.3)
HGB BLD-MCNC: 15.5 G/DL (ref 12–17)
IMM GRANULOCYTES # BLD AUTO: 0.02 THOUSAND/UL (ref 0–0.2)
IMM GRANULOCYTES NFR BLD AUTO: 0 % (ref 0–2)
LYMPHOCYTES # BLD AUTO: 2.37 THOUSANDS/ΜL (ref 0.6–4.47)
LYMPHOCYTES NFR BLD AUTO: 29 % (ref 14–44)
MCH RBC QN AUTO: 30.8 PG (ref 26.8–34.3)
MCHC RBC AUTO-ENTMCNC: 34.9 G/DL (ref 31.4–37.4)
MCV RBC AUTO: 88 FL (ref 82–98)
MONOCYTES # BLD AUTO: 0.63 THOUSAND/ΜL (ref 0.17–1.22)
MONOCYTES NFR BLD AUTO: 8 % (ref 4–12)
NEUTROPHILS # BLD AUTO: 4.88 THOUSANDS/ΜL (ref 1.85–7.62)
NEUTS SEG NFR BLD AUTO: 60 % (ref 43–75)
NRBC BLD AUTO-RTO: 0 /100 WBCS
P AXIS: 80 DEGREES
PLATELET # BLD AUTO: 178 THOUSANDS/UL (ref 149–390)
PMV BLD AUTO: 10.7 FL (ref 8.9–12.7)
PR INTERVAL: 124 MS
QRS AXIS: 1 DEGREES
QRSD INTERVAL: 90 MS
QT INTERVAL: 338 MS
QTC INTERVAL: 439 MS
RBC # BLD AUTO: 5.04 MILLION/UL (ref 3.88–5.62)
T WAVE AXIS: 3 DEGREES
VENTRICULAR RATE: 101 BPM
WBC # BLD AUTO: 8.13 THOUSAND/UL (ref 4.31–10.16)

## 2025-02-15 PROCEDURE — 99232 SBSQ HOSP IP/OBS MODERATE 35: CPT | Performed by: THORACIC SURGERY (CARDIOTHORACIC VASCULAR SURGERY)

## 2025-02-15 PROCEDURE — 90471 IMMUNIZATION ADMIN: CPT | Performed by: THORACIC SURGERY (CARDIOTHORACIC VASCULAR SURGERY)

## 2025-02-15 PROCEDURE — 90656 IIV3 VACC NO PRSV 0.5 ML IM: CPT | Performed by: THORACIC SURGERY (CARDIOTHORACIC VASCULAR SURGERY)

## 2025-02-15 PROCEDURE — 93010 ELECTROCARDIOGRAM REPORT: CPT | Performed by: INTERNAL MEDICINE

## 2025-02-15 PROCEDURE — 99233 SBSQ HOSP IP/OBS HIGH 50: CPT | Performed by: HOSPITALIST

## 2025-02-15 PROCEDURE — 85025 COMPLETE CBC W/AUTO DIFF WBC: CPT | Performed by: THORACIC SURGERY (CARDIOTHORACIC VASCULAR SURGERY)

## 2025-02-15 RX ADMIN — HYDROMORPHONE HYDROCHLORIDE 0.2 MG: 0.2 INJECTION, SOLUTION INTRAMUSCULAR; INTRAVENOUS; SUBCUTANEOUS at 12:14

## 2025-02-15 RX ADMIN — HYDROMORPHONE HYDROCHLORIDE 0.2 MG: 0.2 INJECTION, SOLUTION INTRAMUSCULAR; INTRAVENOUS; SUBCUTANEOUS at 14:35

## 2025-02-15 RX ADMIN — HEPARIN SODIUM 5000 UNITS: 5000 INJECTION INTRAVENOUS; SUBCUTANEOUS at 06:28

## 2025-02-15 RX ADMIN — OXYCODONE HYDROCHLORIDE 5 MG: 5 TABLET ORAL at 22:07

## 2025-02-15 RX ADMIN — OXYCODONE HYDROCHLORIDE 5 MG: 5 TABLET ORAL at 18:30

## 2025-02-15 RX ADMIN — OXYCODONE HYDROCHLORIDE 5 MG: 5 TABLET ORAL at 09:30

## 2025-02-15 RX ADMIN — OXYCODONE HYDROCHLORIDE 5 MG: 5 TABLET ORAL at 04:36

## 2025-02-15 RX ADMIN — OXYCODONE HYDROCHLORIDE 5 MG: 5 TABLET ORAL at 14:04

## 2025-02-15 RX ADMIN — HEPARIN SODIUM 5000 UNITS: 5000 INJECTION INTRAVENOUS; SUBCUTANEOUS at 14:04

## 2025-02-15 RX ADMIN — INFLUENZA VIRUS VACCINE 0.5 ML: 15; 15; 15 SUSPENSION INTRAMUSCULAR at 09:33

## 2025-02-15 RX ADMIN — HYDROMORPHONE HYDROCHLORIDE 0.2 MG: 0.2 INJECTION, SOLUTION INTRAMUSCULAR; INTRAVENOUS; SUBCUTANEOUS at 00:09

## 2025-02-15 RX ADMIN — HEPARIN SODIUM 5000 UNITS: 5000 INJECTION INTRAVENOUS; SUBCUTANEOUS at 22:08

## 2025-02-15 NOTE — ASSESSMENT & PLAN NOTE
Resolved.  White count normalized to 8 from 13.  Patient is not tachycardic any longer.  Most likely reactive initially

## 2025-02-15 NOTE — UTILIZATION REVIEW
Initial Clinical Review    Admission: Date/Time/Statement:   Admission Orders (From admission, onward)       Ordered        02/14/25 0406  INPATIENT ADMISSION  Once                          Orders Placed This Encounter   Procedures    INPATIENT ADMISSION     Standing Status:   Standing     Number of Occurrences:   1     Level of Care:   Med Surg [16]     Estimated length of stay:   More than 2 Midnights     Certification:   I certify that inpatient services are medically necessary for this patient for a duration of greater than two midnights. See H&P and MD Progress Notes for additional information about the patient's course of treatment.     ED Arrival Information       Expected   -    Arrival   2/13/2025 23:03    Acuity   Urgent              Means of arrival   Walk-In    Escorted by   Family Member    Service   Hospitalist    Admission type   Emergency              Arrival complaint   Chest Pain             Chief Complaint   Patient presents with    Chest Pain     Pt. C/o left sided chest pain during inspiration that started today around 1500.        Initial Presentation: 20 y.o. male to ED presents for Acute chest pain on the right side with radiation to the back that started around 9:30 PM. Workup in the ED concerning for right-sided pneumothorax with successful chest tube placement with repeat chest x-ray showing lung reexpansion. chest pain on the right side with radiation to the back that started around 9:30 PM. Workup in the ED concerning for right-sided pneumothorax with successful chest tube placement with repeat chest x-ray showing lung reexpansion. EKG showing sinus tachycardia. PMH for anxiety, use of vape   Admit to Inpatient Dx; Spontaneous Pneumothorax, SIRS  Plan; Chest tube to suction. Continuous O2 monitor. Thoracic Surgery consult.  Monitor off antibiotics.   On exam; in acute distress due to pain. Rigth chest tube in place    Anticipated Length of Stay/Certification Statement: Patient will be  admitted on an inpatient basis with an anticipated length of stay of greater than 2 midnights secondary to pneumothorax requiring thoracic surgery evaluation.    2/14  Thoracic Surgery cons; Spontaneous Pneumothorax  R Chest tube to -20 wall suction, small air leak to cough and valsalva, Keep to suction today, no outpt  Aggressive pulmonary toilet. Incentive spirometry, deep breathing and coughing.  Trial treatment w chest tube and when air leak resolves and we pull the tube, if lung is up on CXR and he feels well it would be reasonable to trial non operative management, however if air leak does not resolve, he has continued air leak, or this recurs down the road, will have to pursue R VATS, may require CT imaging before that     Date: 2/15   Day 2:   Per Thoracic Surgery; Continue chest tube to suction  Likely transition chest tube tow water seal tomorrow.   Aggressive pulmonary toilet. May ambulate in the banks off of suction     Progress notes; Chest tube in place with suction. ontinue aggressive pulmonary toileting.  CXR PA/lat in am. Continue to monitor O2 sat  Certification Statement: The patient will continue to require additional inpatient hospital stay due to primary spontaneous pneumothorax   Pt endorsing pain 7/10 with movement      ED Treatment-Medication Administration from 02/13/2025 2303 to 02/14/2025 1840         Date/Time Order Dose Route Action     02/14/2025 0242 ketamine (Ketalar) injection 110 mg 55 mg Intravenous Given     02/14/2025 0245 lidocaine-epinephrine (XYLOCAINE/EPINEPHRINE) 1 %-1:100,000 injection 1 mL 1 mL Infiltration Given by Other     02/14/2025 0325 HYDROmorphone (DILAUDID) injection 0.5 mg 0.5 mg Intravenous Given     02/14/2025 0446 HYDROmorphone (DILAUDID) injection 0.5 mg 0.5 mg Intravenous Given     02/14/2025 1210 HYDROmorphone HCl (DILAUDID) injection 0.2 mg 0.2 mg Intravenous Given     02/14/2025 1041 oxyCODONE (ROXICODONE) IR tablet 5 mg 5 mg Oral Given     02/14/2025  1530 oxyCODONE (ROXICODONE) IR tablet 5 mg 5 mg Oral Given            Scheduled Medications:  heparin (porcine), 5,000 Units, Subcutaneous, Q8H KESHAV  influenza vaccine, 0.5 mL, Intramuscular, Once      Continuous IV Infusions: None     PRN Meds:  HYDROmorphone, 0.2 mg, Intravenous, Q4H PRN  oxyCODONE, 5 mg, Oral, Q4H PRN 2/14 x1, 2/15 x1  oxyCODONE, 2.5 mg, Oral, Q4H PRN      ED Triage Vitals [02/13/25 2309]   Temperature Pulse Respirations Blood Pressure SpO2 Pain Score   98.2 °F (36.8 °C) 98 18 116/68 99 % 5     Weight (last 2 days)       Date/Time Weight    02/14/25 0156 45.5 (100.31)            Vital Signs (last 3 days)       Date/Time Temp Pulse Resp BP MAP (mmHg) SpO2 Calculated FIO2 (%) - Nasal Cannula Nasal Cannula O2 Flow Rate (L/min) O2 Device Patient Position - Orthostatic VS [place] Coma Scale Score Pain    02/15/25 07:23:28 98.3 °F (36.8 °C) 54 17 115/75 88 99 % -- -- None (Room air) Lying -- --    02/15/25 0436 -- -- -- -- -- -- -- -- -- -- -- 7    02/15/25 0009 -- -- -- -- -- -- -- -- -- -- -- 7 02/14/25 23:06:31 98.2 °F (36.8 °C) 57 18 115/74 88 100 % -- -- -- -- -- --    02/14/25 2057 -- -- -- -- -- -- -- -- -- -- -- 7 02/14/25 20:01:17 97.5 °F (36.4 °C) 60 -- -- -- 100 % -- -- -- -- -- --    02/14/25 1944 -- 51 -- -- -- 100 % -- -- None (Room air) -- 15 5    02/14/25 1900 97.5 °F (36.4 °C) 58 21 125/78 94 -- -- -- -- -- -- --    02/14/25 1858 -- -- -- -- -- -- -- -- -- -- -- 6    02/14/25 18:54:20 99.1 °F (37.3 °C) 81 18 125/78 94 99 % -- -- -- -- -- --    02/14/25 1701 -- 58 -- 133/61 88 100 % -- -- -- -- -- --    02/14/25 1530 -- -- -- -- -- -- -- -- -- -- -- 6    02/14/25 1210 -- -- -- -- -- -- -- -- -- -- -- 10 - Worst Possible Pain    02/14/25 1200 -- 60 -- 129/86 104 100 % -- -- -- -- -- --    02/14/25 1041 -- -- -- -- -- -- -- -- -- -- -- 10 - Worst Possible Pain    02/14/25 1000 -- 57 13 107/56 72 100 % -- -- -- -- -- --    02/14/25 0801 -- 76 -- 100/73 82 100 % -- -- -- -- -- --     02/14/25 0701 -- 63 -- 130/73 96 100 % -- -- -- -- -- --    02/14/25 0500 -- 55 16 115/56 81 99 % -- -- -- -- -- --    02/14/25 0320 -- 75 22 140/102 117 95 % -- -- None (Room air) Lying -- 10 - Worst Possible Pain    02/14/25 0315 -- 73 14 136/90 109 97 % -- -- None (Room air) -- -- 10 - Worst Possible Pain    02/14/25 03:10:32 -- 63 15 145/85 -- 100 % -- -- None (Room air) -- -- --    02/14/25 03:05:17 -- 63 19 155/96 121 100 % 28 2 L/min Nasal cannula Lying -- --    02/14/25 03:00:21 -- 65 14 145/97 110 97 % 28 2 L/min Nasal cannula -- -- --    02/14/25 0255 -- 88 17 154/98 117 95 % -- -- -- Lying -- --    02/14/25 02:50:36 -- 96 14 130/75 98 100 % -- -- Other (comment) Lying -- --    02/14/25 02:45:29 -- 90 12 131/85 -- 86 % -- -- None (Room air) Lying -- --    02/14/25 0240 -- 76 19 114/59 79 100 % -- -- None (Room air) -- -- --    02/14/25 02:27:47 -- -- -- -- -- -- -- -- None (Room air) -- -- --    02/14/25 0225 -- 81 13 119/62 84 100 % -- -- None (Room air) -- -- --    02/14/25 0215 -- 76 16 132/73 95 100 % -- -- -- -- -- --    02/14/25 0115 -- -- -- -- -- -- -- -- None (Room air) -- 15 --    02/13/25 2309 98.2 °F (36.8 °C) 98 18 116/68 -- 99 % -- -- None (Room air) Sitting -- 5              Pertinent Labs/Diagnostic Test Results:   Radiology:  XR chest 1 view portable   ED Interpretation by Jacques Duarte DO (02/14 0502)   Reexpanded right lung with chest tube in place.      Final Interpretation by Martinez Amaya MD (02/14 0902)      Interval placement of right thoracostomy tube. No discernible pneumothorax.            Workstation performed: [id number]         XR chest 2 views   ED Interpretation by Jacques Duarte DO (02/14 0129)   Right-sided pneumothorax      Final Interpretation by Willy Weems MD (02/14 4538)      Moderate to large right pneumothorax.      I concur with clinical team's preliminary interpretation.      Workstation performed: [id number]           Cardiology:  ECG 12 lead     by Interface, Ris Results In (02/13 2313)        GI:  No orders to display           Results from last 7 days   Lab Units 02/15/25  0436 02/14/25  0103   WBC Thousand/uL 8.13 12.75*   HEMOGLOBIN g/dL 15.5 15.5   HEMATOCRIT % 44.4 44.0   PLATELETS Thousands/uL 178 187   TOTAL NEUT ABS Thousands/µL 4.88 10.42*         Results from last 7 days   Lab Units 02/14/25  0103   SODIUM mmol/L 138   POTASSIUM mmol/L 3.5   CHLORIDE mmol/L 102   CO2 mmol/L 29   ANION GAP mmol/L 7   BUN mg/dL 12   CREATININE mg/dL 0.79   EGFR ml/min/1.73sq m 127   CALCIUM mg/dL 9.9     Results from last 7 days   Lab Units 02/14/25  0103   AST U/L 17   ALT U/L 13   ALK PHOS U/L 88   TOTAL PROTEIN g/dL 7.2   ALBUMIN g/dL 5.1*   TOTAL BILIRUBIN mg/dL 0.51         Results from last 7 days   Lab Units 02/14/25  0103   GLUCOSE RANDOM mg/dL 90       Results from last 7 days   Lab Units 02/14/25  0442 02/14/25  0402 02/14/25  0103   HS TNI 0HR ng/L  --   --  <2   HS TNI 2HR ng/L  --  <2  --    HS TNI 4HR ng/L <2  --   --      Results from last 7 days   Lab Units 02/14/25  0103   D-DIMER QUANTITATIVE ug/ml FEU <0.27       Results from last 7 days   Lab Units 02/14/25  0103   LIPASE u/L 7*         Past Medical History:   Diagnosis Date    Pneumonia     Trichophytosis 10/2010    R PINNA    Vitamin D deficiency     resolved 9/15/17     Present on Admission:  **None**      Admitting Diagnosis: Dizziness [R42]  Back pain [M54.9]  Chest pain [R07.9]  Pneumothorax on right [J93.9]  Age/Sex: 20 y.o. male    Network Utilization Review Department  ATTENTION: Please call with any questions or concerns to [phone] and carefully listen to the prompts so that you are directed to the right person. All voicemails are confidential.   For Discharge needs, contact Care Management DC Support Team at [phone] opt. 2  Send all requests for admission clinical reviews, approved or denied determinations and any other requests to dedicated fax number below belonging to  the [facility] where the patient is receiving treatment. List of dedicated fax numbers for the Facilities:  FACILITY NAME UR FAX NUMBER   ADMISSION DENIALS (Administrative/Medical Necessity) [phone]   DISCHARGE SUPPORT TEAM (NETWORK) [phone]   PARENT CHILD HEALTH (Maternity/NICU/Pediatrics) [phone]   [facility] [phone]   [facility] [phone]   [facility] [phone]   [facility] [phone]   [facility] [phone]   [facility] [phone]   [facility] [phone]   [facility] [phone]   [facility] [phone]   [facility] [phone]   [facility] [phone]   [facility] [phone]

## 2025-02-15 NOTE — ASSESSMENT & PLAN NOTE
-has been using vapes daily for 2-3 years  -also states he has a medical marijuana card and smokes

## 2025-02-15 NOTE — PROGRESS NOTES
Progress Note - Hospitalist   Name: Jayesh Hatfield Jr. 20 y.o. male I MRN: [id number]  Unit/Bed#: [facility] 529-01 I Date of Admission: 2/13/2025   Date of Service: 2/15/2025 I Hospital Day: 1    Assessment & Plan  Spontaneous pneumothorax  Primary spontaneous   Chest tube in place with suction.  Saturating at 99% on room air  Thoracic surgery on board and well appreciated  Plan is to waterseal in a.m.  Continue aggressive pulmonary toileting  Will obtain PA lateral chest x-ray in a.m. for interval assessment  May ambulate in the hallway of obstruction  Continue to monitor oxygen saturation    Vapes nicotine containing substance  Encourage cessation  SIRS (systemic inflammatory response syndrome) (HCC)  Resolved.  White count normalized to 8 from 13.  Patient is not tachycardic any longer.  Most likely reactive initially      VTE Pharmacologic Prophylaxis: VTE Score: 0 High Risk (Score >/= 5) - Pharmacological DVT Prophylaxis Ordered: heparin. Sequential Compression Devices Ordered.    Mobility:   Basic Mobility Inpatient Raw Score: 18  JH-HLM Goal: 6: Walk 10 steps or more  JH-HLM Achieved: 1: Laying in bed  JH-HLM Goal achieved. Continue to encourage appropriate mobility.    Patient Centered Rounds: I performed bedside rounds with nursing staff today.   Discussions with Specialists or Other Care Team Provider: Reviewed thoracic surgery note    Education and Discussions with Family / Patient: Updated  (mother) via phone.    Current Length of Stay: 1 day(s)  Current Patient Status: Inpatient   Certification Statement: The patient will continue to require additional inpatient hospital stay due to primary spontaneous pneumothorax  Discharge Plan: Anticipate discharge in 24-48 hrs to home.    Code Status: Level 1 - Full Code    Subjective   Patient endorsed having 7/10 pain with movement only otherwise no pain at rest    Objective :  Temp:  [97.5 °F (36.4 °C)-99.1 °F (37.3 °C)] 98.3 °F (36.8 °C)  HR:  [51-81]  54  BP: (115-133)/(61-86) 115/75  Resp:  [17-21] 17  SpO2:  [99 %-100 %] 99 %  O2 Device: None (Room air)    There is no height or weight on file to calculate BMI.     Input and Output Summary (last 24 hours):     Intake/Output Summary (Last 24 hours) at 2/15/2025 1122  Last data filed at 2/15/2025 1001  Gross per 24 hour   Intake 960 ml   Output 1550 ml   Net -590 ml       Physical Exam  Vitals and nursing note reviewed.   Constitutional:       General: He is not in acute distress.  HENT:      Head: Normocephalic and atraumatic.   Eyes:      Extraocular Movements: Extraocular movements intact.      Conjunctiva/sclera: Conjunctivae normal.      Pupils: Pupils are equal, round, and reactive to light.   Cardiovascular:      Rate and Rhythm: Normal rate and regular rhythm.   Pulmonary:      Effort: Pulmonary effort is normal.      Breath sounds: Normal breath sounds. No stridor. No wheezing, rhonchi or rales.      Comments: Right side chest wall tenderness with chest tube in place    Chest:      Chest wall: Tenderness present.   Abdominal:      General: Abdomen is flat. Bowel sounds are normal.      Palpations: Abdomen is soft.   Skin:     General: Skin is warm and dry.      Capillary Refill: Capillary refill takes less than 2 seconds.   Neurological:      General: No focal deficit present.      Mental Status: He is alert and oriented to person, place, and time.   Psychiatric:         Mood and Affect: Mood normal.         Lines/Drains:  Lines/Drains/Airways       Active Status       Name Placement date Placement time Site Days    Chest Tube 1 Right Midaxillary 16 Fr. 02/14/25  0343  Midaxillary  1                            Lab Results: I have reviewed the following results:   Results from last 7 days   Lab Units 02/15/25  0436   WBC Thousand/uL 8.13   HEMOGLOBIN g/dL 15.5   HEMATOCRIT % 44.4   PLATELETS Thousands/uL 178   SEGS PCT % 60   LYMPHO PCT % 29   MONO PCT % 8   EOS PCT % 3     Results from last 7 days   Lab  Units 02/14/25  0103   SODIUM mmol/L 138   POTASSIUM mmol/L 3.5   CHLORIDE mmol/L 102   CO2 mmol/L 29   BUN mg/dL 12   CREATININE mg/dL 0.79   ANION GAP mmol/L 7   CALCIUM mg/dL 9.9   ALBUMIN g/dL 5.1*   TOTAL BILIRUBIN mg/dL 0.51   ALK PHOS U/L 88   ALT U/L 13   AST U/L 17   GLUCOSE RANDOM mg/dL 90                       Recent Cultures (last 7 days):         Imaging Results Review: No pertinent imaging studies reviewed.  Other Study Results Review: No additional pertinent studies reviewed.    Last 24 Hours Medication List:     Current Facility-Administered Medications:     heparin (porcine) subcutaneous injection 5,000 Units, Q8H KESHAV    HYDROmorphone HCl (DILAUDID) injection 0.2 mg, Q4H PRN    oxyCODONE (ROXICODONE) IR tablet 5 mg, Q4H PRN    oxyCODONE (ROXICODONE) split tablet 2.5 mg, Q4H PRN    Administrative Statements   Today, Patient Was Seen By: Jason Silver MD  I have spent a total time of 30 minutes in caring for this patient on the day of the visit/encounter including Impressions, Counseling / Coordination of care, Documenting in the medical record, Reviewing / ordering tests, medicine, procedures  , and Obtaining or reviewing history  .    **Please Note: This note may have been constructed using a voice recognition system.**

## 2025-02-15 NOTE — PROGRESS NOTES
"Progress Note - Thoracic    Name: Jayesh Hatfield Jr. 20 y.o. male I MRN: [id number]  Unit/Bed#: [facility] 529-01 I Date of Admission: 2/13/2025   Date of Service: 2/16/2025 I Hospital Day: 2     Assessment & Plan  Spontaneous pneumothorax  Sudden onset R chest pain 2/13 at work, no prior episodes, R ptx on CXR. ED placed R CT w resolution of R ptx on subsequent CXR.  CT remains to -20 suction, no air leak.    Plan:  -CT to WS, repeat portable CXR  -IS/OOB  -will trial treatment w chest tube and when air leak resolves and we pull the tube, if lung is up on CXR and he feels well it would be reasonable to trial non operative management, however if air leak does not resolve, he has continued air leak, or this recurs down the road, will have to pursue R VATS, may require CT imaging prior to that  Vapes nicotine containing substance  -has been using vapes daily for 2-3 years  -also states he has a medical marijuana card and smokes    Subjective/Objective   NAOE. Pain controlled, no SOB or chest pain. Walking.     Physical Exam:  General: NAD  CV: nl rate  Lungs: nl wob. No resp distress. On RA. IS 1000cc. R CT (-20, -AL): no output. CDI, NT.  ABD: Soft, ND, NT  Extrem: No CCE  UOP 500cc + unrecorded    Patient Vitals for the past 24 hrs:   BP Temp Temp src Pulse Resp SpO2 Height   02/15/25 2151 121/58 97.6 °F (36.4 °C) Oral 67 16 98 % --   02/15/25 1423 127/80 97.6 °F (36.4 °C) -- 61 20 99 % --   02/15/25 1310 -- -- -- -- -- -- 5' 1.5\" (1.562 m)   02/15/25 0723 115/75 98.3 °F (36.8 °C) Oral (!) 54 17 99 % --       I/O         02/13 0701  02/14 0700 02/14 0701  02/15 0700 02/15 0701  02/16 0700    P.O.  960     Total Intake(mL/kg)  960 (21.1)     Urine (mL/kg/hr)  1550 (1.4)     Chest Tube  0 0    Total Output  1550 0    Net  -590 0                   Recent Labs     02/14/25  0103 02/15/25  0436 02/16/25  0440   WBC 12.75* 8.13 8.56   HGB 15.5 15.5 15.8    178 182   SODIUM 138  --  141   K 3.5  --  3.5     --  99 "   CO2 29  --  32   BUN 12  --  13   CREATININE 0.79  --  0.74   GLUC 90  --  96   CALCIUM 9.9  --  9.9   AST 17  --   --    ALT 13  --   --    ALKPHOS 88  --   --    TBILI 0.51  --   --    LIPASE 7*  --   --    EGFR 127  --  130     Lab Results   Component Value Date    LEUKOCYTESUR NEGATIVE 09/15/2017

## 2025-02-15 NOTE — ASSESSMENT & PLAN NOTE
Primary spontaneous   Chest tube in place with suction.  Saturating at 99% on room air  Thoracic surgery on board and well appreciated  Plan is to waterseal in a.m.  Continue aggressive pulmonary toileting  Will obtain PA lateral chest x-ray in a.m. for interval assessment  May ambulate in the hallway of obstruction  Continue to monitor oxygen saturation

## 2025-02-15 NOTE — PLAN OF CARE
Problem: PAIN - ADULT  Goal: Verbalizes/displays adequate comfort level or baseline comfort level  Description: Interventions:  - Encourage patient to monitor pain and request assistance  - Assess pain using appropriate pain scale  - Administer analgesics based on type and severity of pain and evaluate response  - Implement non-pharmacological measures as appropriate and evaluate response  - Consider cultural and social influences on pain and pain management  - Notify physician/advanced practitioner if interventions unsuccessful or patient reports new pain  Outcome: Progressing     Problem: INFECTION - ADULT  Goal: Absence or prevention of progression during hospitalization  Description: INTERVENTIONS:  - Assess and monitor for signs and symptoms of infection  - Monitor lab/diagnostic results  - Monitor all insertion sites, i.e. indwelling lines, tubes, and drains  - Monitor endotracheal if appropriate and nasal secretions for changes in amount and color  - [place] appropriate cooling/warming therapies per order  - Administer medications as ordered  - Instruct and encourage patient and family to use good hand hygiene technique  - Identify and instruct in appropriate isolation precautions for identified infection/condition  Outcome: Progressing  Goal: Absence of fever/infection during neutropenic period  Description: INTERVENTIONS:  - Monitor WBC    Outcome: Progressing     Problem: SAFETY ADULT  Goal: Patient will remain free of falls  Description: INTERVENTIONS:  - Educate patient/family on patient safety including physical limitations  - Instruct patient to call for assistance with activity   - Consult OT/PT to assist with strengthening/mobility   - Keep Call bell within reach  - Keep bed low and locked with side rails adjusted as appropriate  - Keep care items and personal belongings within reach  - Initiate and maintain comfort rounds  - Make Fall Risk Sign visible to staff  - Offer Toileting every  Hours,  in advance of need  - Initiate/Maintain alarm  - Obtain necessary fall risk management equipment:   - Apply yellow socks and bracelet for high fall risk patients  - Consider moving patient to room near nurses station  Outcome: Progressing  Goal: Maintain or return to baseline ADL function  Description: INTERVENTIONS:  -  Assess patient's ability to carry out ADLs; assess patient's baseline for ADL function and identify physical deficits which impact ability to perform ADLs (bathing, care of mouth/teeth, toileting, grooming, dressing, etc.)  - Assess/evaluate cause of self-care deficits   - Assess range of motion  - Assess patient's mobility; develop plan if impaired  - Assess patient's need for assistive devices and provide as appropriate  - Encourage maximum independence but intervene and supervise when necessary  - Involve family in performance of ADLs  - Assess for home care needs following discharge   - Consider OT consult to assist with ADL evaluation and planning for discharge  - Provide patient education as appropriate  Outcome: Progressing  Goal: Maintains/Returns to pre admission functional level  Description: INTERVENTIONS:  - Perform AM-PAC 6 Click Basic Mobility/ Daily Activity assessment daily.  - Set and communicate daily mobility goal to care team and patient/family/caregiver.   - Collaborate with rehabilitation services on mobility goals if consulted  - Perform Range of Motion  times a day.  - Reposition patient every  hours.  - Dangle patient  times a day  - Stand patient  times a day  - Ambulate patient  times a day  - Out of bed to chair  times a day   - Out of bed for meals  times a day  - Out of bed for toileting  - Record patient progress and toleration of activity level   Outcome: Progressing     Problem: DISCHARGE PLANNING  Goal: Discharge to home or other facility with appropriate resources  Description: INTERVENTIONS:  - Identify barriers to discharge w/patient and caregiver  - Arrange for  needed discharge resources and transportation as appropriate  - Identify discharge learning needs (meds, wound care, etc.)  - Arrange for interpretive services to assist at discharge as needed  - Refer to Case Management Department for coordinating discharge planning if the patient needs post-hospital services based on physician/advanced practitioner order or complex needs related to functional status, cognitive ability, or social support system  Outcome: Progressing     Problem: Knowledge Deficit  Goal: Patient/family/caregiver demonstrates understanding of disease process, treatment plan, medications, and discharge instructions  Description: Complete learning assessment and assess knowledge base.  Interventions:  - Provide teaching at level of understanding  - Provide teaching via preferred learning methods  Outcome: Progressing

## 2025-02-15 NOTE — ASSESSMENT & PLAN NOTE
Sudden onset R chest pain 2/13 at work, no prior episodes, R ptx on CXR. ED placed R CT w resolution of R ptx on subsequent CXR.  CT remains to -20 suction, small air leak with cough/valsalva.    Plan:  -CT to [facility], repeat portable CXR  -IS/OOB  -will trial treatment w chest tube and when air leak resolves and we pull the tube, if lung is up on CXR and he feels well it would be reasonable to trial non operative management, however if air leak does not resolve, he has continued air leak, or this recurs down the road, will have to pursue R VATS, may require CT imaging prior to that

## 2025-02-15 NOTE — ASSESSMENT & PLAN NOTE
Sudden onset R chest pain 2/13 at work, no prior episodes, R ptx on CXR. ED placed R CT w resolution of R ptx on subsequent CXR.  CT remains to -20 suction, no air leak.    Plan:  -CT to [facility], repeat portable CXR  -IS/OOB  -will trial treatment w chest tube and when air leak resolves and we pull the tube, if lung is up on CXR and he feels well it would be reasonable to trial non operative management, however if air leak does not resolve, he has continued air leak, or this recurs down the road, will have to pursue R VATS, may require CT imaging prior to that

## 2025-02-16 ENCOUNTER — APPOINTMENT (INPATIENT)
Dept: RADIOLOGY | Facility: HOSPITAL | Age: 20
End: 2025-02-16
Payer: COMMERCIAL

## 2025-02-16 LAB
ANION GAP SERPL CALCULATED.3IONS-SCNC: 10 MMOL/L (ref 4–13)
BASOPHILS # BLD AUTO: 0.04 THOUSANDS/ΜL (ref 0–0.1)
BASOPHILS NFR BLD AUTO: 1 % (ref 0–1)
BUN SERPL-MCNC: 13 MG/DL (ref 5–25)
CALCIUM SERPL-MCNC: 9.9 MG/DL (ref 8.4–10.2)
CHLORIDE SERPL-SCNC: 99 MMOL/L (ref 96–108)
CO2 SERPL-SCNC: 32 MMOL/L (ref 21–32)
CREAT SERPL-MCNC: 0.74 MG/DL (ref 0.6–1.3)
EOSINOPHIL # BLD AUTO: 0.19 THOUSAND/ΜL (ref 0–0.61)
EOSINOPHIL NFR BLD AUTO: 2 % (ref 0–6)
ERYTHROCYTE [DISTWIDTH] IN BLOOD BY AUTOMATED COUNT: 11.6 % (ref 11.6–15.1)
GFR SERPL CREATININE-BSD FRML MDRD: 130 ML/MIN/1.73SQ M
GLUCOSE SERPL-MCNC: 96 MG/DL (ref 65–140)
HCT VFR BLD AUTO: 46.6 % (ref 36.5–49.3)
HGB BLD-MCNC: 15.8 G/DL (ref 12–17)
IMM GRANULOCYTES # BLD AUTO: 0.04 THOUSAND/UL (ref 0–0.2)
IMM GRANULOCYTES NFR BLD AUTO: 1 % (ref 0–2)
LYMPHOCYTES # BLD AUTO: 1.5 THOUSANDS/ΜL (ref 0.6–4.47)
LYMPHOCYTES NFR BLD AUTO: 18 % (ref 14–44)
MCH RBC QN AUTO: 30 PG (ref 26.8–34.3)
MCHC RBC AUTO-ENTMCNC: 33.9 G/DL (ref 31.4–37.4)
MCV RBC AUTO: 89 FL (ref 82–98)
MONOCYTES # BLD AUTO: 0.67 THOUSAND/ΜL (ref 0.17–1.22)
MONOCYTES NFR BLD AUTO: 8 % (ref 4–12)
NEUTROPHILS # BLD AUTO: 6.12 THOUSANDS/ΜL (ref 1.85–7.62)
NEUTS SEG NFR BLD AUTO: 70 % (ref 43–75)
NRBC BLD AUTO-RTO: 0 /100 WBCS
PLATELET # BLD AUTO: 182 THOUSANDS/UL (ref 149–390)
PMV BLD AUTO: 10.9 FL (ref 8.9–12.7)
POTASSIUM SERPL-SCNC: 3.5 MMOL/L (ref 3.5–5.3)
RBC # BLD AUTO: 5.26 MILLION/UL (ref 3.88–5.62)
SODIUM SERPL-SCNC: 141 MMOL/L (ref 135–147)
WBC # BLD AUTO: 8.56 THOUSAND/UL (ref 4.31–10.16)

## 2025-02-16 PROCEDURE — 71046 X-RAY EXAM CHEST 2 VIEWS: CPT

## 2025-02-16 PROCEDURE — 99233 SBSQ HOSP IP/OBS HIGH 50: CPT | Performed by: HOSPITALIST

## 2025-02-16 PROCEDURE — 99232 SBSQ HOSP IP/OBS MODERATE 35: CPT | Performed by: THORACIC SURGERY (CARDIOTHORACIC VASCULAR SURGERY)

## 2025-02-16 PROCEDURE — 80048 BASIC METABOLIC PNL TOTAL CA: CPT | Performed by: HOSPITALIST

## 2025-02-16 PROCEDURE — 85025 COMPLETE CBC W/AUTO DIFF WBC: CPT | Performed by: HOSPITALIST

## 2025-02-16 RX ADMIN — HEPARIN SODIUM 5000 UNITS: 5000 INJECTION INTRAVENOUS; SUBCUTANEOUS at 22:21

## 2025-02-16 RX ADMIN — HEPARIN SODIUM 5000 UNITS: 5000 INJECTION INTRAVENOUS; SUBCUTANEOUS at 14:28

## 2025-02-16 RX ADMIN — OXYCODONE HYDROCHLORIDE 5 MG: 5 TABLET ORAL at 05:20

## 2025-02-16 RX ADMIN — Medication 2.5 MG: at 22:20

## 2025-02-16 RX ADMIN — HEPARIN SODIUM 5000 UNITS: 5000 INJECTION INTRAVENOUS; SUBCUTANEOUS at 05:20

## 2025-02-16 NOTE — UTILIZATION REVIEW
"NOTIFICATION OF INPATIENT ADMISSION   AUTHORIZATION REQUEST   SERVICING FACILITY:   [facility]  Address: [address] [place] [address]  Tax ID: [id number]  NPI: [id number] ATTENDING PROVIDER:  Attending Name and NPI#: Jason Silver Md [[id number]]  Address: [address] [place] [address]  Phone: [phone]   ADMISSION INFORMATION:  Place of Service: Inpatient [facility]  Place of Service Code: 21  Inpatient Admission Date/Time: 2/14/25  4:06 AM  Discharge Date/Time: No discharge date for patient encounter.  Admitting Diagnosis Code/Description:  Dizziness [R42]  Back pain [M54.9]  Chest pain [R07.9]  Pneumothorax on right [J93.9]     UTILIZATION REVIEW CONTACT:  Alem Thomas"Fatuma\" Brendan Utilization   Network Utilization Review Department  Phone: [phone]  Fax: [phone]  Email: [email or web address]  Contact for approvals/pending authorizations, clinical reviews, and discharge.     PHYSICIAN ADVISORY SERVICES:  Medical Necessity Denial & [id number]-cp-[id number] Review  Phone: [phone]  Fax: [phone]  Email: [email or web address]     DISCHARGE SUPPORT TEAM:  For Patients Discharge Needs & Updates  Phone: [phone] opt. 2 Fax: [phone]  Email: [email or web address]      "

## 2025-02-16 NOTE — ASSESSMENT & PLAN NOTE
Primary spontaneous   Chest tube in place with waterseal this morning  Thoracic surgery recommended CXR after suction was discontinued.  CXR is resulted as interval increase in size of the small right apical pneumothorax >as seen on 2/16/2025  Thoracic surg recommended chest tube removal in a.m. if there is no airleak.  Will defer to them for further management  Continue aggressive pulmonary toileting  Continue to ambulate in the hallway   Continue to monitor oxygen saturation

## 2025-02-16 NOTE — PROGRESS NOTES
Progress Note - Hospitalist   Name: Jayesh Hatfield Jr. 20 y.o. male I MRN: [id number]  Unit/Bed#: [facility] 529-01 I Date of Admission: 2/13/2025   Date of Service: 2/16/2025 I Hospital Day: 2    Assessment & Plan  Spontaneous pneumothorax  Primary spontaneous   Chest tube in place with waterseal this morning  Thoracic surgery recommended CXR after suction was discontinued.  CXR is resulted as interval increase in size of the small right apical pneumothorax >as seen on 2/16/2025  Thoracic surg recommended chest tube removal in a.m. if there is no airleak.  Will defer to them for further management  Continue aggressive pulmonary toileting  Continue to ambulate in the hallway   Continue to monitor oxygen saturation    Vapes nicotine containing substance  Encourage cessation  SIRS (systemic inflammatory response syndrome) (HCC)  Resolved.  White count normalized to 8 from 13.  Patient is not tachycardic any longer.  Most likely reactive initially      VTE Pharmacologic Prophylaxis: VTE Score: 0 Moderate Risk (Score 3-4) - Pharmacological DVT Prophylaxis Ordered: heparin.    Mobility:   Basic Mobility Inpatient Raw Score: 24  JH-HLM Goal: 8: Walk 250 feet or more  JH-HLM Achieved: 8: Walk 250 feet ot more  JH-HLM Goal NOT achieved. Continue with multidisciplinary rounding and encourage appropriate mobility to improve upon JH-HLM goals.    Patient Centered Rounds: I performed bedside rounds with nursing staff today.   Discussions with Specialists or Other Care Team Provider: Thoracic surgery    Education and Discussions with Family / Patient: Updated  (mother) via phone.    Current Length of Stay: 2 day(s)  Current Patient Status: Inpatient   Certification Statement: The patient will continue to require additional inpatient hospital stay due to spontaneous primary pneumothorax  Discharge Plan: Anticipate discharge tomorrow to home.    Code Status: Level 1 - Full Code    Subjective   Endorsed feeling better although  still complains of pain with position change only      Objective :  Temp:  [97.6 °F (36.4 °C)-98.1 °F (36.7 °C)] 98.1 °F (36.7 °C)  HR:  [61-69] 69  BP: (118-127)/(58-80) 118/72  Resp:  [16-20] 17  SpO2:  [97 %-99 %] 97 %  O2 Device: None (Room air)    Body mass index is 18.89 kg/m².     Input and Output Summary (last 24 hours):     Intake/Output Summary (Last 24 hours) at 2/16/2025 1047  Last data filed at 2/16/2025 0832  Gross per 24 hour   Intake 900 ml   Output 505 ml   Net 395 ml       Physical Exam  Vitals and nursing note reviewed.   Constitutional:       General: He is not in acute distress.  HENT:      Head: Normocephalic and atraumatic.   Eyes:      Extraocular Movements: Extraocular movements intact.      Conjunctiva/sclera: Conjunctivae normal.      Pupils: Pupils are equal, round, and reactive to light.   Cardiovascular:      Rate and Rhythm: Normal rate and regular rhythm.   Pulmonary:      Effort: Pulmonary effort is normal.      Breath sounds: Normal breath sounds. No stridor. No wheezing, rhonchi or rales.      Comments: Right side chest wall tenderness  Chest:      Chest wall: Tenderness present. Chest tube in place but water sealed   Abdominal:      General: Abdomen is flat. Bowel sounds are normal.      Palpations: Abdomen is soft.   Skin:     General: Skin is warm and dry.      Capillary Refill: Capillary refill takes less than 2 seconds.   Neurological:      General: No focal deficit present.      Mental Status: He is alert and oriented to person, place, and time.   Psychiatric:         Mood and Affect: Mood normal    Lines/Drains:  Lines/Drains/Airways       Active Status       Name Placement date Placement time Site Days    Chest Tube 1 Right Midaxillary 16 Fr. 02/14/25  0343  Midaxillary  2                            Lab Results: I have reviewed the following results:   Results from last 7 days   Lab Units 02/16/25  0440   WBC Thousand/uL 8.56   HEMOGLOBIN g/dL 15.8   HEMATOCRIT % 46.6    PLATELETS Thousands/uL 182   SEGS PCT % 70   LYMPHO PCT % 18   MONO PCT % 8   EOS PCT % 2     Results from last 7 days   Lab Units 02/16/25  0440 02/14/25  0103   SODIUM mmol/L 141 138   POTASSIUM mmol/L 3.5 3.5   CHLORIDE mmol/L 99 102   CO2 mmol/L 32 29   BUN mg/dL 13 12   CREATININE mg/dL 0.74 0.79   ANION GAP mmol/L 10 7   CALCIUM mg/dL 9.9 9.9   ALBUMIN g/dL  --  5.1*   TOTAL BILIRUBIN mg/dL  --  0.51   ALK PHOS U/L  --  88   ALT U/L  --  13   AST U/L  --  17   GLUCOSE RANDOM mg/dL 96 90       Recent Cultures (last 7 days):         Imaging Results Review: I reviewed radiology reports from this admission including: xray(s).  Other Study Results Review: No additional pertinent studies reviewed.    Last 24 Hours Medication List:     Current Facility-Administered Medications:     heparin (porcine) subcutaneous injection 5,000 Units, Q8H KESHAV    HYDROmorphone HCl (DILAUDID) injection 0.2 mg, Q4H PRN    oxyCODONE (ROXICODONE) IR tablet 5 mg, Q4H PRN    oxyCODONE (ROXICODONE) split tablet 2.5 mg, Q4H PRN    Administrative Statements   Today, Patient Was Seen By: Jason Silver MD  I have spent a total time of 30 minutes in caring for this patient on the day of the visit/encounter including Impressions, Documenting in the medical record, and Reviewing / ordering tests, medicine, procedures  .    **Please Note: This note may have been constructed using a voice recognition system.**

## 2025-02-16 NOTE — PLAN OF CARE
Problem: PAIN - ADULT  Goal: Verbalizes/displays adequate comfort level or baseline comfort level  Description: Interventions:  - Encourage patient to monitor pain and request assistance  - Assess pain using appropriate pain scale  - Administer analgesics based on type and severity of pain and evaluate response  - Implement non-pharmacological measures as appropriate and evaluate response  - Consider cultural and social influences on pain and pain management  - Notify physician/advanced practitioner if interventions unsuccessful or patient reports new pain  Outcome: Progressing     Problem: INFECTION - ADULT  Goal: Absence or prevention of progression during hospitalization  Description: INTERVENTIONS:  - Assess and monitor for signs and symptoms of infection  - Monitor lab/diagnostic results  - Monitor all insertion sites, i.e. indwelling lines, tubes, and drains  - Monitor endotracheal if appropriate and nasal secretions for changes in amount and color  - [place] appropriate cooling/warming therapies per order  - Administer medications as ordered  - Instruct and encourage patient and family to use good hand hygiene technique  - Identify and instruct in appropriate isolation precautions for identified infection/condition  Outcome: Progressing  Goal: Absence of fever/infection during neutropenic period  Description: INTERVENTIONS:  - Monitor WBC    Outcome: Progressing     Problem: SAFETY ADULT  Goal: Patient will remain free of falls  Description: INTERVENTIONS:  - Educate patient/family on patient safety including physical limitations  - Instruct patient to call for assistance with activity   - Consult OT/PT to assist with strengthening/mobility   - Keep Call bell within reach  - Keep bed low and locked with side rails adjusted as appropriate  - Keep care items and personal belongings within reach  - Initiate and maintain comfort rounds  - Make Fall Risk Sign visible to staff  - Offer Toileting every  Hours,  in advance of need  - Initiate/Maintain alarm  - Obtain necessary fall risk management equipment:   - Apply yellow socks and bracelet for high fall risk patients  - Consider moving patient to room near nurses station  Outcome: Progressing  Goal: Maintain or return to baseline ADL function  Description: INTERVENTIONS:  -  Assess patient's ability to carry out ADLs; assess patient's baseline for ADL function and identify physical deficits which impact ability to perform ADLs (bathing, care of mouth/teeth, toileting, grooming, dressing, etc.)  - Assess/evaluate cause of self-care deficits   - Assess range of motion  - Assess patient's mobility; develop plan if impaired  - Assess patient's need for assistive devices and provide as appropriate  - Encourage maximum independence but intervene and supervise when necessary  - Involve family in performance of ADLs  - Assess for home care needs following discharge   - Consider OT consult to assist with ADL evaluation and planning for discharge  - Provide patient education as appropriate  Outcome: Progressing  Goal: Maintains/Returns to pre admission functional level  Description: INTERVENTIONS:  - Perform AM-PAC 6 Click Basic Mobility/ Daily Activity assessment daily.  - Set and communicate daily mobility goal to care team and patient/family/caregiver.   - Collaborate with rehabilitation services on mobility goals if consulted  - Perform Range of Motion  times a day.  - Reposition patient every  hours.  - Dangle patient  times a day  - Stand patient  times a day  - Ambulate patient  times a day  - Out of bed to chair  times a day   - Out of bed for meals  times a day  - Out of bed for toileting  - Record patient progress and toleration of activity level   Outcome: Progressing     Problem: DISCHARGE PLANNING  Goal: Discharge to home or other facility with appropriate resources  Description: INTERVENTIONS:  - Identify barriers to discharge w/patient and caregiver  - Arrange for  needed discharge resources and transportation as appropriate  - Identify discharge learning needs (meds, wound care, etc.)  - Arrange for interpretive services to assist at discharge as needed  - Refer to Case Management Department for coordinating discharge planning if the patient needs post-hospital services based on physician/advanced practitioner order or complex needs related to functional status, cognitive ability, or social support system  Outcome: Progressing     Problem: Knowledge Deficit  Goal: Patient/family/caregiver demonstrates understanding of disease process, treatment plan, medications, and discharge instructions  Description: Complete learning assessment and assess knowledge base.  Interventions:  - Provide teaching at level of understanding  - Provide teaching via preferred learning methods  Outcome: Progressing     Problem: Nutrition/Hydration-ADULT  Goal: Nutrient/Hydration intake appropriate for improving, restoring or maintaining nutritional needs  Description: Monitor and assess patient's nutrition/hydration status for malnutrition. Collaborate with interdisciplinary team and initiate plan and interventions as ordered.  Monitor patient's weight and dietary intake as ordered or per policy. Utilize nutrition screening tool and intervene as necessary. Determine patient's food preferences and provide high-protein, high-caloric foods as appropriate.     INTERVENTIONS:  - Monitor oral intake, urinary output, labs, and treatment plans  - Assess nutrition and hydration status and recommend course of action  - Evaluate amount of meals eaten  - Assist patient with eating if necessary   - Allow adequate time for meals  - Recommend/ encourage appropriate diets, oral nutritional supplements, and vitamin/mineral supplements  - Order, calculate, and assess calorie counts as needed  - Recommend, monitor, and adjust tube feedings and TPN/PPN based on assessed needs  - Assess need for intravenous fluids  -  Provide specific nutrition/hydration education as appropriate  - Include patient/family/caregiver in decisions related to nutrition  Outcome: Progressing     Problem: RESPIRATORY - ADULT  Goal: Achieves optimal ventilation and oxygenation  Description: INTERVENTIONS:  - Assess for changes in respiratory status  - Assess for changes in mentation and behavior  - Position to facilitate oxygenation and minimize respiratory effort  - Oxygen administered by appropriate delivery if ordered  - Initiate smoking cessation education as indicated  - Encourage broncho-pulmonary hygiene including cough, deep breathe, Incentive Spirometry  - Assess the need for suctioning and aspirate as needed  - Assess and instruct to report SOB or any respiratory difficulty  - Respiratory Therapy support as indicated  Outcome: Progressing     Problem: SKIN/TISSUE INTEGRITY - ADULT  Goal: Incision(s), wounds(s) or drain site(s) healing without S/S of infection  Description: INTERVENTIONS  - Assess and document dressing, incision, wound bed, drain sites and surrounding tissue  - Provide patient and family education  - Perform skin care/dressing changes every  Outcome: Progressing

## 2025-02-17 ENCOUNTER — TELEPHONE (OUTPATIENT)
Dept: PEDIATRICS CLINIC | Facility: MEDICAL CENTER | Age: 20
End: 2025-02-17

## 2025-02-17 ENCOUNTER — APPOINTMENT (INPATIENT)
Dept: RADIOLOGY | Facility: HOSPITAL | Age: 20
End: 2025-02-17
Payer: COMMERCIAL

## 2025-02-17 VITALS
WEIGHT: 100 LBS | TEMPERATURE: 98 F | DIASTOLIC BLOOD PRESSURE: 81 MMHG | RESPIRATION RATE: 15 BRPM | BODY MASS INDEX: 18.88 KG/M2 | HEIGHT: 61 IN | HEART RATE: 65 BPM | SYSTOLIC BLOOD PRESSURE: 121 MMHG | OXYGEN SATURATION: 95 %

## 2025-02-17 PROCEDURE — 71046 X-RAY EXAM CHEST 2 VIEWS: CPT

## 2025-02-17 PROCEDURE — 99238 HOSP IP/OBS DSCHRG MGMT 30/<: CPT

## 2025-02-17 PROCEDURE — 99231 SBSQ HOSP IP/OBS SF/LOW 25: CPT | Performed by: THORACIC SURGERY (CARDIOTHORACIC VASCULAR SURGERY)

## 2025-02-17 RX ADMIN — HEPARIN SODIUM 5000 UNITS: 5000 INJECTION INTRAVENOUS; SUBCUTANEOUS at 05:08

## 2025-02-17 NOTE — TELEPHONE ENCOUNTER
Please remove Dr. Kirby as PCP as he is retired, and patient has aged out of practice. Thank you.

## 2025-02-17 NOTE — PROGRESS NOTES
Pastoral Care Progress Note             02/17/25 1600   Clinical Encounter Type   Visited With Patient      intro found pt no wanting a visit.  remains available.

## 2025-02-17 NOTE — PROGRESS NOTES
Progress Note - Thoracic    Name: Jayesh Hatfield Jr. 20 y.o. male I MRN: [id number]  Unit/Bed#: [facility] 529-01 I Date of Admission: 2/13/2025   Date of Service: 2/17/2025 I Hospital Day: 3    Assessment & Plan  Spontaneous pneumothorax  -Sudden onset R chest pain 2/13 at work, no prior episodes, R ptx on CXR. ED placed R CT w resolution of R ptx on subsequent CXR.  CT remains to -20 suction, no air leak.  -Right chest tube switched to WS yesterday, small increase in apical right PTX, no airleak, minimal 5 cc serosanguineous output, on room air  -IS/OOB  -Will discuss timing of chest tube removal with team  Vapes nicotine containing substance  -has been using vapes daily for 2-3 years  -also states he has a medical marijuana card and smokes        24 Hour Events : Chest tube switched to waterseal yesterday  Subjective : Doing well, ambulating, using incentive spirometry, voiding, no significant shortness of breath.    Objective :  Temp:  [98.1 °F (36.7 °C)-99.2 °F (37.3 °C)] 98.1 °F (36.7 °C)  HR:  [59-69] 59  BP: (112-118)/(68-72) 112/69  Resp:  [16-17] 17  SpO2:  [96 %-99 %] 99 %  O2 Device: None (Room air)    I/O         02/15 0701  02/16 0700 02/16 0701  02/17 0700    P.O. 600 780    Total Intake(mL/kg) 600 (13.2) 780 (17.2)    Urine (mL/kg/hr) 500 (0.5) 0 (0)    Chest Tube 5 5    Total Output 505 5    Net +95 +775          Unmeasured Urine Occurrence  1 x          Lines/Drains/Airways       Active Status       Name Placement date Placement time Site Days    Chest Tube 1 Right Midaxillary 16 Fr. 02/14/25  0343  Midaxillary  3                  Physical Exam    General: NAD  Skin: Warm, dry, anicteric  HEENT: Normocephalic, atraumatic  CV: RRR, no m/r/g  Pulm: CTA b/l, no inc WOB, room air, chest tube as above  Abd: Soft, ND/NT  MSK: Symmetric, no edema, no tenderness, no deformity  Neuro: AOx3, GCS 15       Lab Results: I have reviewed the following results:  Recent Labs     02/16/25  0440   WBC 8.56   HGB 15.8   HCT  46.6      SODIUM 141   K 3.5   CL 99   CO2 32   BUN 13   CREATININE 0.74   GLUC 96             VTE Pharmacologic Prophylaxis: VTE covered by:  heparin (porcine), Subcutaneous, 5,000 Units at 02/17/25 0508     VTE Mechanical Prophylaxis: sequential compression device

## 2025-02-17 NOTE — ASSESSMENT & PLAN NOTE
Resolved.  White count normalized to 8 from 13.  Patient is not tachycardic any longer.  Most likely reactive initially from spontaneous pneumothorax described above

## 2025-02-17 NOTE — PLAN OF CARE
Problem: PAIN - ADULT  Goal: Verbalizes/displays adequate comfort level or baseline comfort level  Description: Interventions:  - Encourage patient to monitor pain and request assistance  - Assess pain using appropriate pain scale  - Administer analgesics based on type and severity of pain and evaluate response  - Implement non-pharmacological measures as appropriate and evaluate response  - Consider cultural and social influences on pain and pain management  - Notify physician/advanced practitioner if interventions unsuccessful or patient reports new pain  Outcome: Progressing     Problem: INFECTION - ADULT  Goal: Absence or prevention of progression during hospitalization  Description: INTERVENTIONS:  - Assess and monitor for signs and symptoms of infection  - Monitor lab/diagnostic results  - Monitor all insertion sites, i.e. indwelling lines, tubes, and drains  - Monitor endotracheal if appropriate and nasal secretions for changes in amount and color  - [place] appropriate cooling/warming therapies per order  - Administer medications as ordered  - Instruct and encourage patient and family to use good hand hygiene technique  - Identify and instruct in appropriate isolation precautions for identified infection/condition  Outcome: Progressing  Goal: Absence of fever/infection during neutropenic period  Description: INTERVENTIONS:  - Monitor WBC    Outcome: Progressing     Problem: SAFETY ADULT  Goal: Patient will remain free of falls  Description: INTERVENTIONS:  - Educate patient/family on patient safety including physical limitations  - Instruct patient to call for assistance with activity   - Consult OT/PT to assist with strengthening/mobility   - Keep Call bell within reach  - Keep bed low and locked with side rails adjusted as appropriate  - Keep care items and personal belongings within reach  - Initiate and maintain comfort rounds  - Make Fall Risk Sign visible to staff  - Apply yellow socks and bracelet  for high fall risk patients  - Consider moving patient to room near nurses station  Outcome: Progressing  Goal: Maintain or return to baseline ADL function  Description: INTERVENTIONS:  -  Assess patient's ability to carry out ADLs; assess patient's baseline for ADL function and identify physical deficits which impact ability to perform ADLs (bathing, care of mouth/teeth, toileting, grooming, dressing, etc.)  - Assess/evaluate cause of self-care deficits   - Assess range of motion  - Assess patient's mobility; develop plan if impaired  - Assess patient's need for assistive devices and provide as appropriate  - Encourage maximum independence but intervene and supervise when necessary  - Involve family in performance of ADLs  - Assess for home care needs following discharge   - Consider OT consult to assist with ADL evaluation and planning for discharge  - Provide patient education as appropriate  Outcome: Progressing  Goal: Maintains/Returns to pre admission functional level  Description: INTERVENTIONS:  - Perform AM-PAC 6 Click Basic Mobility/ Daily Activity assessment daily.  - Set and communicate daily mobility goal to care team and patient/family/caregiver.   - Collaborate with rehabilitation services on mobility goals if consulted  - Perform Range of Motion 2 times a day.  - Reposition patient every 2 hours.  - Dangle patient 2 times a day  - Stand patient 2 times a day  - Ambulate patient 2 times a day  - Out of bed to chair 2 times a day   - Out of bed for meals 2 times a day  - Out of bed for toileting  - Record patient progress and toleration of activity level   Outcome: Progressing     Problem: DISCHARGE PLANNING  Goal: Discharge to home or other facility with appropriate resources  Description: INTERVENTIONS:  - Identify barriers to discharge w/patient and caregiver  - Arrange for needed discharge resources and transportation as appropriate  - Identify discharge learning needs (meds, wound care, etc.)  -  Arrange for interpretive services to assist at discharge as needed  - Refer to Case Management Department for coordinating discharge planning if the patient needs post-hospital services based on physician/advanced practitioner order or complex needs related to functional status, cognitive ability, or social support system  Outcome: Progressing     Problem: Knowledge Deficit  Goal: Patient/family/caregiver demonstrates understanding of disease process, treatment plan, medications, and discharge instructions  Description: Complete learning assessment and assess knowledge base.  Interventions:  - Provide teaching at level of understanding  - Provide teaching via preferred learning methods  Outcome: Progressing     Problem: Nutrition/Hydration-ADULT  Goal: Nutrient/Hydration intake appropriate for improving, restoring or maintaining nutritional needs  Description: Monitor and assess patient's nutrition/hydration status for malnutrition. Collaborate with interdisciplinary team and initiate plan and interventions as ordered.  Monitor patient's weight and dietary intake as ordered or per policy. Utilize nutrition screening tool and intervene as necessary. Determine patient's food preferences and provide high-protein, high-caloric foods as appropriate.     INTERVENTIONS:  - Monitor oral intake, urinary output, labs, and treatment plans  - Assess nutrition and hydration status and recommend course of action  - Evaluate amount of meals eaten  - Assist patient with eating if necessary   - Allow adequate time for meals  - Recommend/ encourage appropriate diets, oral nutritional supplements, and vitamin/mineral supplements  - Order, calculate, and assess calorie counts as needed  - Recommend, monitor, and adjust tube feedings and TPN/PPN based on assessed needs  - Assess need for intravenous fluids  - Provide specific nutrition/hydration education as appropriate  - Include patient/family/caregiver in decisions related to  nutrition  Outcome: Progressing     Problem: RESPIRATORY - ADULT  Goal: Achieves optimal ventilation and oxygenation  Description: INTERVENTIONS:  - Assess for changes in respiratory status  - Assess for changes in mentation and behavior  - Position to facilitate oxygenation and minimize respiratory effort  - Oxygen administered by appropriate delivery if ordered  - Initiate smoking cessation education as indicated  - Encourage broncho-pulmonary hygiene including cough, deep breathe, Incentive Spirometry  - Assess the need for suctioning and aspirate as needed  - Assess and instruct to report SOB or any respiratory difficulty  - Respiratory Therapy support as indicated  Outcome: Progressing     Problem: SKIN/TISSUE INTEGRITY - ADULT  Goal: Incision(s), wounds(s) or drain site(s) healing without S/S of infection  Description: INTERVENTIONS  - Assess and document dressing, incision, wound bed, drain sites and surrounding tissue  - Provide patient and family education  - Perform skin care/dressing changes   Outcome: Progressing

## 2025-02-17 NOTE — QUICK NOTE
02/17/25    Procedure: Chest tube removal    chest tube removed in routine fashion without incident.  The patient tolerated the procedure well. A dry, sterile dressing was placed. Will check a pa/lat chest x-ray.     Amylyn Jena Mortimer, PA-C

## 2025-02-17 NOTE — ASSESSMENT & PLAN NOTE
Primary spontaneous   Chest tube in place with waterseal this morning  Thoracic surgery recommended CXR after suction was discontinued.  CXR is resulted as interval increase in size of the small right apical pneumothorax >as seen on 2/16/2025  Patient has been set to waterseal since 2/16    Plan:  Continue aggressive pulmonary toileting  Continue to ambulate in the hallway   Continue to monitor oxygen saturation, target greater than 88% respiratory protocol  Appreciate Thoracics recommendations regarding timing/management of removal of chest tube with post removal chest x-ray

## 2025-02-17 NOTE — PLAN OF CARE
Problem: PAIN - ADULT  Goal: Verbalizes/displays adequate comfort level or baseline comfort level  Description: Interventions:  - Encourage patient to monitor pain and request assistance  - Assess pain using appropriate pain scale  - Administer analgesics based on type and severity of pain and evaluate response  - Implement non-pharmacological measures as appropriate and evaluate response  - Consider cultural and social influences on pain and pain management  - Notify physician/advanced practitioner if interventions unsuccessful or patient reports new pain  Outcome: Progressing     Problem: INFECTION - ADULT  Goal: Absence or prevention of progression during hospitalization  Description: INTERVENTIONS:  - Assess and monitor for signs and symptoms of infection  - Monitor lab/diagnostic results  - Monitor all insertion sites, i.e. indwelling lines, tubes, and drains  - Monitor endotracheal if appropriate and nasal secretions for changes in amount and color  - Fraser appropriate cooling/warming therapies per order  - Administer medications as ordered  - Instruct and encourage patient and family to use good hand hygiene technique  - Identify and instruct in appropriate isolation precautions for identified infection/condition  Outcome: Progressing  Goal: Absence of fever/infection during neutropenic period  Description: INTERVENTIONS:  - Monitor WBC    Outcome: Progressing     Problem: SAFETY ADULT  Goal: Patient will remain free of falls  Description: INTERVENTIONS:  - Educate patient/family on patient safety including physical limitations  - Instruct patient to call for assistance with activity   - Consult OT/PT to assist with strengthening/mobility   - Keep Call bell within reach  - Keep bed low and locked with side rails adjusted as appropriate  - Keep care items and personal belongings within reach  - Initiate and maintain comfort rounds  - Make Fall Risk Sign visible to staff  - Apply yellow socks and bracelet  for high fall risk patients  - Consider moving patient to room near nurses station  Outcome: Progressing  Goal: Maintain or return to baseline ADL function  Description: INTERVENTIONS:  -  Assess patient's ability to carry out ADLs; assess patient's baseline for ADL function and identify physical deficits which impact ability to perform ADLs (bathing, care of mouth/teeth, toileting, grooming, dressing, etc.)  - Assess/evaluate cause of self-care deficits   - Assess range of motion  - Assess patient's mobility; develop plan if impaired  - Assess patient's need for assistive devices and provide as appropriate  - Encourage maximum independence but intervene and supervise when necessary  - Involve family in performance of ADLs  - Assess for home care needs following discharge   - Consider OT consult to assist with ADL evaluation and planning for discharge  - Provide patient education as appropriate  Outcome: Progressing  Goal: Maintains/Returns to pre admission functional level  Description: INTERVENTIONS:  - Perform AM-PAC 6 Click Basic Mobility/ Daily Activity assessment daily.  - Set and communicate daily mobility goal to care team and patient/family/caregiver.   - Collaborate with rehabilitation services on mobility goals if consulted  - Perform Range of Motion 2 times a day.  - Reposition patient every 2 hours.  - Dangle patient 2 times a day  - Stand patient 2 times a day  - Ambulate patient 2 times a day  - Out of bed to chair 2 times a day   - Out of bed for meals 2 times a day  - Out of bed for toileting  - Record patient progress and toleration of activity level   Outcome: Progressing     Problem: DISCHARGE PLANNING  Goal: Discharge to home or other facility with appropriate resources  Description: INTERVENTIONS:  - Identify barriers to discharge w/patient and caregiver  - Arrange for needed discharge resources and transportation as appropriate  - Identify discharge learning needs (meds, wound care, etc.)  -  Arrange for interpretive services to assist at discharge as needed  - Refer to Case Management Department for coordinating discharge planning if the patient needs post-hospital services based on physician/advanced practitioner order or complex needs related to functional status, cognitive ability, or social support system  Outcome: Progressing     Problem: Knowledge Deficit  Goal: Patient/family/caregiver demonstrates understanding of disease process, treatment plan, medications, and discharge instructions  Description: Complete learning assessment and assess knowledge base.  Interventions:  - Provide teaching at level of understanding  - Provide teaching via preferred learning methods  Outcome: Progressing     Problem: Nutrition/Hydration-ADULT  Goal: Nutrient/Hydration intake appropriate for improving, restoring or maintaining nutritional needs  Description: Monitor and assess patient's nutrition/hydration status for malnutrition. Collaborate with interdisciplinary team and initiate plan and interventions as ordered.  Monitor patient's weight and dietary intake as ordered or per policy. Utilize nutrition screening tool and intervene as necessary. Determine patient's food preferences and provide high-protein, high-caloric foods as appropriate.     INTERVENTIONS:  - Monitor oral intake, urinary output, labs, and treatment plans  - Assess nutrition and hydration status and recommend course of action  - Evaluate amount of meals eaten  - Assist patient with eating if necessary   - Allow adequate time for meals  - Recommend/ encourage appropriate diets, oral nutritional supplements, and vitamin/mineral supplements  - Order, calculate, and assess calorie counts as needed  - Recommend, monitor, and adjust tube feedings and TPN/PPN based on assessed needs  - Assess need for intravenous fluids  - Provide specific nutrition/hydration education as appropriate  - Include patient/family/caregiver in decisions related to  nutrition  Outcome: Progressing     Problem: RESPIRATORY - ADULT  Goal: Achieves optimal ventilation and oxygenation  Description: INTERVENTIONS:  - Assess for changes in respiratory status  - Assess for changes in mentation and behavior  - Position to facilitate oxygenation and minimize respiratory effort  - Oxygen administered by appropriate delivery if ordered  - Initiate smoking cessation education as indicated  - Encourage broncho-pulmonary hygiene including cough, deep breathe, Incentive Spirometry  - Assess the need for suctioning and aspirate as needed  - Assess and instruct to report SOB or any respiratory difficulty  - Respiratory Therapy support as indicated  Outcome: Progressing     Problem: SKIN/TISSUE INTEGRITY - ADULT  Goal: Incision(s), wounds(s) or drain site(s) healing without S/S of infection  Description: INTERVENTIONS  - Assess and document dressing, incision, wound bed, drain sites and surrounding tissue  - Provide patient and family education  - Perform skin care/dressing changes   Outcome: Progressing

## 2025-02-17 NOTE — DISCHARGE SUMMARY
Discharge Summary - Hospitalist   Name: Jayesh Hatfield Jr. 20 y.o. male I MRN: [id number]  Unit/Bed#: [facility] 529-01 I Date of Admission: 2/13/2025   Date of Service: 2/17/2025 I Hospital Day: 3     Assessment & Plan  Spontaneous pneumothorax  Primary spontaneous   Chest tube in place with waterseal this morning  Thoracic surgery recommended CXR after suction was discontinued.  CXR is resulted as interval increase in size of the small right apical pneumothorax >as seen on 2/16/2025  Patient has been set to waterseal since 2/16    Plan:  Continue aggressive pulmonary toileting  Continue to ambulate in the hallway   Continue to monitor oxygen saturation, target greater than 88% respiratory protocol  Chest tube removed on 2/17, no complications    Vapes nicotine containing substance  Encourage cessation  SIRS (systemic inflammatory response syndrome) (HCC)  Resolved.  White count normalized to 8 from 13.  Patient is not tachycardic any longer.  Most likely reactive initially from spontaneous pneumothorax described above       Medical Problems       Resolved Problems  Date Reviewed: 10/30/2020   None       Discharging Physician / Practitioner: Ai Francis MD  PCP: Deangelo Kirby MD  Admission Date:   Admission Orders (From admission, onward)       Ordered        02/14/25 0406  INPATIENT ADMISSION  Once                          Discharge Date: 02/17/25    Consultations During Hospital Stay:  Thoracic    Procedures Performed:   Chest tube placement and removal    Significant Findings / Test Results:   Right-sided pneumothorax    Incidental Findings:   None    Test Results Pending at Discharge (will require follow up):   None in process     Outpatient Tests Requested:  Will require outpatient chest x-ray with Thoracics 2 weeks after discharge, PA/lateral    Complications: None    Reason for Admission: Shortness of breath, PTX    Hospital Course:   Jayesh Hatfield Jr. is a 20 y.o. male patient who originally presented to the  "[facility] on 2/13/2025 due to right-sided chest pain with radiation to the back evening prior to admission on 2/13, found to have pneumothorax.  Also has a history of vaping nicotine.  Found to have SIRS on admission with tachycardia of 98, WBC of 12.8 that was attributed to pneumothorax, treated without antibiotics.  D-dimer was negative.  Thoracic surgery treated with placement of chest tube with successful clamping trial, changed to waterseal, and removal of chest tube on 2/17 with stable repeat chest x-ray.  Patient was discharged home without any complications and advised to quit vaping.    Please see above list of diagnoses and related plan for additional information.     Condition at Discharge: good    Discharge Day Visit / Exam:   Subjective: No complaints  Vitals: Blood Pressure: 121/81 (02/17/25 1507)  Pulse: 65 (02/17/25 1507)  Temperature: 98 °F (36.7 °C) (02/17/25 1507)  Temp Source: Oral (02/17/25 1507)  Respirations: 15 (02/17/25 1507)  Height: 5' 1\" (154.9 cm) (02/16/25 0700)  Weight - Scale: 45.4 kg (100 lb) (02/16/25 0700)  SpO2: 95 % (02/17/25 1507)  Physical Exam  Vitals reviewed.   Constitutional:       General: He is not in acute distress.     Comments: Appears thin   HENT:      Head: Normocephalic and atraumatic.      Mouth/Throat:      Mouth: Mucous membranes are moist.      Pharynx: Oropharynx is clear.   Eyes:      General: No scleral icterus.     Extraocular Movements: Extraocular movements intact.   Cardiovascular:      Rate and Rhythm: Normal rate and regular rhythm.      Heart sounds: No murmur heard.     No friction rub. No gallop.   Pulmonary:      Effort: Pulmonary effort is normal. No respiratory distress.      Breath sounds: No stridor. No wheezing or rales.      Comments: Right-sided chest tube dressing with mild strikethrough.  Dressing is not soaked  Abdominal:      General: There is no distension.      Palpations: There is no mass.      Tenderness: There is no abdominal " tenderness. There is no guarding.   Musculoskeletal:         General: Normal range of motion.   Skin:     General: Skin is warm and dry.      Findings: No rash.   Neurological:      Mental Status: He is alert.   Psychiatric:         Mood and Affect: Mood normal.         Behavior: Behavior normal.         Thought Content: Thought content normal.          Discussion with Family: Patient declined call to .     Discharge instructions/Information to patient and family:   See after visit summary for information provided to patient and family.      Provisions for Follow-Up Care:  See after visit summary for information related to follow-up care and any pertinent home health orders.      Mobility at time of Discharge:   Basic Mobility Inpatient Raw Score: 24  JH-HLM Goal: 8: Walk 250 feet or more  JH-HLM Achieved: 8: Walk 250 feet ot more  HLM Goal achieved. Continue to encourage appropriate mobility.     Disposition:   Home    Planned Readmission: No    Discharge Medications:  See after visit summary for reconciled discharge medications provided to patient and/or family.    Administrative Statements   Discharge Statement:  I have spent a total time of 40 minutes in caring for this patient on the day of the visit/encounter. >30 minutes of time was spent on: Diagnostic results, Prognosis, Impressions, Counseling / Coordination of care, Documenting in the medical record, Reviewing / ordering tests, medicine, procedures  , and Communicating with other healthcare professionals .    **Please Note: This note may have been constructed using a voice recognition system**

## 2025-02-17 NOTE — DISCHARGE INSTR - AVS FIRST PAGE
"You have a follow up appointment with Dr. Washington in thoracic surgery on 3/4/2025 at 4:00pm. Our office is in the [address] at [address] [facility]. Please go to [address].  You should obtain a chest X-ray 1-3 days prior to this appt. The chest x-ray order is in the system. Please go to any [facility] radiology facility for this, no appointment is needed.      Pneumothorax (collapsed lung)    The Basics  Written by the doctors and editors at [facility]  What is a pneumothorax?  A pneumothorax happens when air leaks out of the lung and collects in the space between the lung and the chest wall. This makes the lung collapse (figure 1).  Other conditions can also cause a collapsed lung. But this article discusses a collapsed lung caused by air leaking out. This is also called a \"punctured\" lung.  A pneumothorax can be mild (a small collection of air) or severe (a large collection of air). A severe pneumothorax is a medical emergency and can be life-threatening.  What causes a pneumothorax?  A lung injury, such as from an accident or surgery, can cause a pneumothorax.  A pneumothorax can also happen in people who do not have a lung injury. Some lung conditions and behaviors make a pneumothorax more likely to happen. These include:  ? Chronic obstructive pulmonary disease (\"COPD\") - This is a lung condition caused by smoking that makes it hard to breathe. It is also called \"emphysema.\"  ? Certain lung infections  ? Cystic fibrosis - This is a condition that some children are born with. It causes lung damage and lung infections.  ? Smoking  ? Conditions that cause cysts (air-filled sacs) in the lung  Sometimes, a pneumothorax happens in people who have no known lung condition or injury.  What are the symptoms of a pneumothorax?  People with a small pneumothorax might not have any symptoms. They might find out that they have it when they have a chest X-ray for another reason.  When people do have symptoms, " INFECTIOUS DISEASE PROGRESS NOTE    Patient: Anabel Weldon Date: 2018   : 1971 Attending: Javi Weston MD   47 year old female        Chief Complain:   Patient is a 47 year old female S/P cadaver KT with Thymo induction , now with fever    Subjective:   Feels like she is slowly getting better.  Denies fever, chills, N/V/D, abdominal pain  Afebrile, WBCs trending down to 9.9    Current Meds:    furosemide 80 mg Intravenous TID   mycophenolate 1,000 mg Oral BIDTX   clotrimazole 10 mg Oral 4x Daily   sodium hypochlorite  Topical Daily   ceFEPime (MAXIPIME) IVPB for Most Indications 1,000 mg Intravenous Daily   metroNIDAZOLE (FLAGYL) IVPB 500 mg Intravenous 3 times per day   polyethylene glycol 17 g Oral Daily   heparin (porcine) 7,500 Units Subcutaneous 3 times per day   valGANciclovir 450 mg Oral Every Other Day   aspirin 81 mg Oral Daily   epoetin kayla 20,000 Units Subcutaneous Once per day on Sun Tue Thu   predniSONE 15 mg Oral Daily Tx   Followed by      [START ON 2018] predniSONE 10 mg Oral Daily Tx   Followed by      [START ON 2018] predniSONE 5 mg Oral Daily Tx   pantoprazole 40 mg Oral Nightly        Immunosuppressives:  Thymo/Tacro//Pred    Antibiotics:   Cefepime day 7, Metonidazole day 7    Antiviral: Valcyte 450 qod     Reviewed Pertinent: Allergies, Medications, Medical History, Radiology and Labs    Vital Last Value   Temperature 98.6 °F (37 °C) (18)   Pulse 88 (18)   Respiratory 18 (18)   Non-Invasive  Blood Pressure 138/86 (18)   Vitals with min/max:      Vital Last Value 24 Hour Range   Temperature 98.6 °F (37 °C) (18) Temp  Min: 98.2 °F (36.8 °C)  Max: 98.9 °F (37.2 °C)   Pulse 88 (18) Pulse  Min: 84  Max: 88   Respiratory 18 (18) Resp  Min: 16  Max: 18   Non-Invasive  Blood Pressure 138/86 (18) BP  Min: 118/72  Max: 138/86   Pulse Oximetry 98 % (18) SpO2  Min: 98  "they usually start suddenly and include:  ? Chest pain that can be sharp or stabbing  ? Trouble breathing  Should I call the doctor or nurse?  Yes. The symptoms listed above can also be caused by other conditions. But if you have these symptoms, call your doctor or nurse right away.  Is there a test for a pneumothorax?  Yes. Your doctor or nurse will ask about your symptoms, do an exam, and do a chest X-ray or ultrasound. They might also do a CT scan. These are imaging tests that can create pictures of the inside of the body.  How is a pneumothorax treated?  Treatment depends on your symptoms.  If you have few or no symptoms, your doctor might treat you by giving you oxygen and following your condition. That's because a pneumothorax often gets better on its own. To follow your condition, your doctor might do a few chest X-rays over time.  If your pneumothorax is causing symptoms, your doctor will remove the air that has collected outside of your lung. They can do this in different ways:  ? Sometimes, the doctor can put a needle through your ribs, then suck out the air using a syringe.  ? Other times, your doctor will make a small hole in between your ribs. They will put a tube through the hole and into the collection of air. The tube will stay in your chest for a few days.  If your doctor uses a large tube (called a \"chest tube\"), you will need to stay in the hospital while it is in your chest. If your doctor uses a small tube, you might be able to go home during this time.  If this procedure doesn't work after a few days, your doctor might need to do lung surgery to close off the air leak. This surgery is called \"thoracoscopy.\" During thoracoscopy, the doctor gives you medicine to make you sleep. Then, they make 2 or 3 small cuts between the ribs in your chest. They put long, thin tools in the cuts and into the space where the air collected. One of the tools has a camera on the end, which sends pictures to a TV " %  Max: 100 %   Arterial   Blood Pressure 108/60 (06/02/18 1230) No Data Recorded       Weight  (Last 4 values)     Weight    06/08/18 0600 06/09/18 0550 06/10/18 0542 06/11/18 0631   Weight: 93.1 kg 93.4 kg 94.6 kg 94.9 kg         Physical Exam:  General: Alert and oriented X 3  HEENT: Head atraumatic without any lesions, neck supple and soft. Nose : normal nares. Throat without exudates, ulcers,erythema  Lungs: Clear to auscultation, no crackles or wheezing.  Chest wall normal  CV: S1 S2 RRR no murmurs, S3, S4 rubs or gallop.  Abdomen soft. Incision, not tender or warn to touch, No induration  Skin exam: normal  Joint exam : normal        Laboratory Results:    Recent Labs  Lab 06/11/18  0600 06/10/18  0600 06/09/18  0605  06/05/18  1731   SODIUM 130* 131* 135  < > 136   BUN 53* 49* 48*  < > 27*   CREATININE 5.84* 5.75* 5.77*  < > 4.19*   GFRNA 8 8 8  < > 12   GFRA 9 9 9  < > 14   GLUCOSE 88 94 78  < > 107*   CALCIUM 8.8 8.8 8.6  < > 8.2*   ALBUMIN  --   --   --   --  2.5*   AST  --   --   --   --  20   GPT  --   --   --   --  17   ALKPT  --   --   --   --  67   BILIRUBIN  --   --   --   --  2.0*   < > = values in this interval not displayed.    Recent Labs  Lab 06/11/18  0600 06/10/18  0600 06/09/18  0605   WBC 9.9 11.2* 11.6*   HGB 8.3* 8.7* 7.8*   HCT 24.3* 26.4* 23.9*    286 307       Recent Labs  Lab 06/05/18  0825   UWBC NEGATIVE   UBACTR NONE SEEN   UNITR NEGATIVE   LEUK 1 to 5   URBC MODERATE*          Imaging: Reviewed  CT transplant Abd/pelvis 6/9:  IMPRESSION:  There is at least one fluid collection inferior to the right lower quadrant  renal transplant which contains density and gas and may represent  infection. Smaller collection inferior to this is slightly denser and  probably represents a hematoma although this may be contiguous with the  other collection.    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    Consolidative opacity with air bronchograms  in the right lower quadrant  with bilateral pleural effusions. Pneumonia is not excluded.       CXR 6/5:  IMPRESSION:  1. Medical support apparatus as described above.  2. Right lower lung opacity, possibly corresponding to atelectasis and/or  consolidation or pleural fluid.    Assessment:   47 year old female S/P Cadaveric Kidney Transplant  On 5/26/18 with post operative fever  Perinephric hematoma could be the source of the current infection, a lot of the fluid was free flowing and drained out from the incision 6/5/18 and was cultured and is + for E coli , serratia and pseudomonas - likely contaminant      Immunosuppression: Thymo/Prograf/MMF/Pred  Prophylactic antivirals: Valcyte    Plan:  Continue cefepime and metronidazole  Wait for re-assess of abdominal fluid collection on repeat CT in 1-2 days     DAYTON Rose  Infectious Disease  Pager: [phone]    ATTENDING    Agree with  note above by DAYTON Rose Infectious Disease, I concur with her findings and agree with her plan of care  Discussed in detail with transplant team, plan for repeat imaging to look for any clearly defined fluid collection, if present . This should be resent for culture and drained  Continue antibiotics until then    Genesis Hill MD  Infectious Disease    Pager: [phone]  Office: [phone]     screen. The doctor can look at the image on the screen to do the surgery.  If you need thoracoscopy to treat your pneumothorax, your doctor might do another surgical procedure during thoracoscopy to help prevent a future pneumothorax. They might also do this procedure if you have had more than 1 pneumothorax, even if the air leak stopped quickly.  Another way to help prevent a future pneumothorax is for your doctor to put a substance in your chest tube. This substance goes into the space where the air collected and helps the lung stick to the chest wall. This helps prevent air from collecting again.  What else should I do after treatment?  If you smoke, you should quit. This can help lower your chance of getting another pneumothorax.  Ask your doctor when you can fly in an airplane or go scuba diving, if you want to do these activities. Some people need to wait a certain amount of time after treatment before it's safe for them to fly or go scuba diving.  All topics are updated as new evidence becomes available and our peer review process is complete.  This topic retrieved from [employer] on: Feb 12, 2025.  Topic 22885 Version 16.0  Release: 32.10.4 - C33.42  © 2025 [employer], [employer] and/or its affiliates. All rights reserved.  figure 1: Pneumothorax (collapsed lung)

## 2025-02-17 NOTE — ASSESSMENT & PLAN NOTE
Primary spontaneous   Chest tube in place with waterseal this morning  Thoracic surgery recommended CXR after suction was discontinued.  CXR is resulted as interval increase in size of the small right apical pneumothorax >as seen on 2/16/2025  Patient has been set to waterseal since 2/16    Plan:  Continue aggressive pulmonary toileting  Continue to ambulate in the hallway   Continue to monitor oxygen saturation, target greater than 88% respiratory protocol  Chest tube removed on 2/17, no complications

## 2025-02-17 NOTE — PROGRESS NOTES
Progress Note - Hospitalist   Name: Jayesh Hatfield Jr. 20 y.o. male I MRN: [id number]  Unit/Bed#: [facility] 529-01 I Date of Admission: 2/13/2025   Date of Service: 2/17/2025 I Hospital Day: 3    Assessment & Plan  Spontaneous pneumothorax  Primary spontaneous   Chest tube in place with waterseal this morning  Thoracic surgery recommended CXR after suction was discontinued.  CXR is resulted as interval increase in size of the small right apical pneumothorax >as seen on 2/16/2025  Patient has been set to waterseal since 2/16    Plan:  Continue aggressive pulmonary toileting  Continue to ambulate in the hallway   Continue to monitor oxygen saturation, target greater than 88% respiratory protocol  Appreciate Thor[facility]ics recommendations regarding timing/management of removal of chest tube with post removal chest x-ray    Vapes nicotine containing substance  Encourage cessation  SIRS (systemic inflammatory response syndrome) (HCC)  Resolved.  White count normalized to 8 from 13.  Patient is not tachycardic any longer.  Most likely reactive initially from spontaneous pneumothorax described above      VTE Pharmacologic Prophylaxis: VTE Score: 0 Low Risk (Score 0-2) - Encourage Ambulation.    Mobility:   Basic Mobility Inpatient Raw Score: 24  JH-HLM Goal: 8: Walk 250 feet or more  JH-HLM Achieved: 8: Walk 250 feet ot more  JH-HLM Goal achieved. Continue to encourage appropriate mobility.    Patient Centered Rounds: I performed bedside rounds with nursing staff today.   Discussions with Specialists or Other Care Team Provider: [facility]    Education and Discussions with Family / Patient: Patient declined call to .     Current Length of Stay: 3 day(s)  Current Patient Status: Inpatient   Certification Statement: The patient will continue to require additional inpatient hospital stay due to PTX management w chest tube  Discharge Plan: Anticipate discharge in 24-48 hrs to home.    Code Status: Level 1 - Full  Code    Subjective   Patient reports pain at chest tube insertion site. Denies fevers/chills, chest pain, shortness of breath, heart palpitations, nausea, vomiting, abdominal pain, weakness, or numbness.      Objective :  Temp:  [98.1 °F (36.7 °C)-99.2 °F (37.3 °C)] 98.1 °F (36.7 °C)  HR:  [56-61] 56  BP: (111-113)/(68-69) 111/68  Resp:  [16-18] 18  SpO2:  [95 %-99 %] 95 %  O2 Device: None (Room air)    Body mass index is 18.89 kg/m².     Input and Output Summary (last 24 hours):     Intake/Output Summary (Last 24 hours) at 2/17/2025 0912  Last data filed at 2/17/2025 0722  Gross per 24 hour   Intake 600 ml   Output 5 ml   Net 595 ml       Physical Exam  Vitals reviewed.   Constitutional:       General: He is not in acute distress.  HENT:      Head: Normocephalic and atraumatic.      Mouth/Throat:      Mouth: Mucous membranes are moist.      Pharynx: Oropharynx is clear.   Eyes:      General: No scleral icterus.  Cardiovascular:      Rate and Rhythm: Normal rate and regular rhythm.      Heart sounds: No murmur heard.     No friction rub. No gallop.   Pulmonary:      Effort: Pulmonary effort is normal. No respiratory distress.      Breath sounds: No stridor. No wheezing or rales.      Comments: Right-sided chest wall tenderness.  Chest tube to waterseal  Abdominal:      General: There is no distension.      Palpations: There is no mass.      Tenderness: There is no abdominal tenderness. There is no guarding.   Musculoskeletal:         General: Normal range of motion.   Skin:     General: Skin is warm and dry.      Findings: No rash.   Neurological:      Mental Status: He is alert.   Psychiatric:         Mood and Affect: Mood normal.         Behavior: Behavior normal.         Thought Content: Thought content normal.           Lines/Drains:  Lines/Drains/Airways       Active Status       Name Placement date Placement time Site Days    Chest Tube 1 Right Midaxillary 16 Fr. 02/14/25  0343  Midaxillary  3                             Lab Results: I have reviewed the following results:   Results from last 7 days   Lab Units 02/16/25  0440   WBC Thousand/uL 8.56   HEMOGLOBIN g/dL 15.8   HEMATOCRIT % 46.6   PLATELETS Thousands/uL 182   SEGS PCT % 70   LYMPHO PCT % 18   MONO PCT % 8   EOS PCT % 2     Results from last 7 days   Lab Units 02/16/25  0440 02/14/25  0103   SODIUM mmol/L 141 138   POTASSIUM mmol/L 3.5 3.5   CHLORIDE mmol/L 99 102   CO2 mmol/L 32 29   BUN mg/dL 13 12   CREATININE mg/dL 0.74 0.79   ANION GAP mmol/L 10 7   CALCIUM mg/dL 9.9 9.9   ALBUMIN g/dL  --  5.1*   TOTAL BILIRUBIN mg/dL  --  0.51   ALK PHOS U/L  --  88   ALT U/L  --  13   AST U/L  --  17   GLUCOSE RANDOM mg/dL 96 90                       Recent Cultures (last 7 days):         Imaging Results Review: I reviewed radiology reports from this admission including: chest xray.  Other Study Results Review: EKG was reviewed.     Last 24 Hours Medication List:     Current Facility-Administered Medications:     heparin (porcine) subcutaneous injection 5,000 Units, Q8H KESHAV    HYDROmorphone HCl (DILAUDID) injection 0.2 mg, Q4H PRN    oxyCODONE (ROXICODONE) IR tablet 5 mg, Q4H PRN    oxyCODONE (ROXICODONE) split tablet 2.5 mg, Q4H PRN    Administrative Statements   Today, Patient Was Seen By: Ai Francis MD  I have spent a total time of 40 minutes in caring for this patient on the day of the visit/encounter including Diagnostic results, Prognosis, Patient and family education, Risk factor reductions, Impressions, Counseling / Coordination of care, Documenting in the medical record, Reviewing/placing orders in the medical record (including tests, medications, and/or procedures), and Obtaining or reviewing history  .    **Please Note: This note may have been constructed using a voice recognition system.**

## 2025-02-17 NOTE — ASSESSMENT & PLAN NOTE
-Sudden onset R chest pain 2/13 at work, no prior episodes, R ptx on CXR. ED placed R CT w resolution of R ptx on subsequent CXR.  CT remains to -20 suction, no air leak.  -Right chest tube switched to WS yesterday, small increase in apical right PTX, no airleak, minimal 5 cc serosanguineous output, on room air  -IS/OOB  -Will discuss timing of chest tube removal with team

## 2025-02-19 NOTE — UTILIZATION REVIEW
"NOTIFICATION OF INPATIENT ADMISSION   AUTHORIZATION REQUEST   SERVICING FACILITY:   [facility]  Address: [address] [place] [address]  Tax ID: [id number]  NPI: [id number] ATTENDING PROVIDER:  Attending Name and NPI#: Ai Francis Md [[id number]]  Address: [address] [place] [address]  Phone: [phone]   ADMISSION INFORMATION:  Place of Service: Inpatient [facility]  Place of Service Code: 21  Inpatient Admission Date/Time: 2/14/25  4:06 AM  Discharge Date/Time: 2/17/2025  4:42 PM  Admitting Diagnosis Code/Description:  Dizziness [R42]  Back pain [M54.9]  Chest pain [R07.9]  Pneumothorax on right [J93.9]     UTILIZATION REVIEW CONTACT:  Alem Thomas"Fatuma\" Brendan Utilization   Network Utilization Review Department  Phone: [phone]  Fax: [phone]  Email: [email or web address]  Contact for approvals/pending authorizations, clinical reviews, and discharge.     PHYSICIAN ADVISORY SERVICES:  Medical Necessity Denial & Vzdg-ur-Fomb Review  Phone: [phone]  Fax: [phone]  Email: [email or web address]     DISCHARGE SUPPORT TEAM:  For Patients Discharge Needs & Updates  Phone: [phone] opt. 2 Fax: [phone]  Email: [email or web address]     "

## 2025-02-24 NOTE — TELEPHONE ENCOUNTER
02/23/25 7:43 PM        The office's request has been received, reviewed, and the patient chart updated. The PCP has successfully been removed with a patient attribution note. This message will now be completed.        Thank you  Vignesh Mckinney

## 2025-02-28 ENCOUNTER — TELEPHONE (OUTPATIENT)
Dept: CARDIAC SURGERY | Facility: CLINIC | Age: 20
End: 2025-02-28

## 2025-02-28 NOTE — TELEPHONE ENCOUNTER
I called and left a voicemail for the pt's mother Na in regards to the pt's post op appointment on 03/04/25 @ 4 pm with Dr. Washington. I informed the pt's mother that the pt will need to get a CXR prior to this appointment. I advised her to please call the office if she should have any questions regarding this.

## 2025-03-04 ENCOUNTER — HOSPITAL ENCOUNTER (OUTPATIENT)
Dept: RADIOLOGY | Facility: HOSPITAL | Age: 20
Discharge: HOME | End: 2025-03-04
Payer: COMMERCIAL

## 2025-03-04 ENCOUNTER — OFFICE VISIT (OUTPATIENT)
Dept: CARDIAC SURGERY | Facility: CLINIC | Age: 20
End: 2025-03-04
Payer: COMMERCIAL

## 2025-03-04 ENCOUNTER — TELEPHONE (OUTPATIENT)
Age: 20
End: 2025-03-04

## 2025-03-04 VITALS
HEART RATE: 96 BPM | HEIGHT: 61 IN | BODY MASS INDEX: 19.81 KG/M2 | WEIGHT: 104.94 LBS | TEMPERATURE: 98 F | SYSTOLIC BLOOD PRESSURE: 118 MMHG | OXYGEN SATURATION: 98 % | DIASTOLIC BLOOD PRESSURE: 76 MMHG | RESPIRATION RATE: 16 BRPM

## 2025-03-04 DIAGNOSIS — J93.83 SPONTANEOUS PNEUMOTHORAX: Primary | ICD-10-CM

## 2025-03-04 DIAGNOSIS — J93.9 PNEUMOTHORAX ON RIGHT: ICD-10-CM

## 2025-03-04 PROCEDURE — 99213 OFFICE O/P EST LOW 20 MIN: CPT | Performed by: THORACIC SURGERY (CARDIOTHORACIC VASCULAR SURGERY)

## 2025-03-04 PROCEDURE — 71046 X-RAY EXAM CHEST 2 VIEWS: CPT

## 2025-03-04 NOTE — ASSESSMENT & PLAN NOTE
Mr. Hatfield seems to be doing well after discharge.  I have asked him to go to the hospital on the way out in order to obtain a PA and lateral chest x-ray that I can view.  We reviewed that smoking cigarettes or marijuana as well as vaping will increase his risk of recurrent pneumothorax.  He understands that he has an approximate lifetime risk of 25% for recurrence on this side.  He knows to go to emergency department if he develops shortness of breath and chest pain on the right.  And if he develops a second pneumothorax on the same side we would recommend bleb resection and pleurodesis.  He knows that he can call the office at anytime with questions or concerns and we will see him back on an as-needed basis.

## 2025-03-04 NOTE — PROGRESS NOTES
Name: Jayesh Hatfield Jr.      : 2005      MRN: [id number]  Encounter Provider: Esequiel Washington MD  Encounter Date: 3/4/2025   Encounter department: [facility]  :  Assessment & Plan  Spontaneous pneumothorax  Mr. Hatfield seems to be doing well after discharge.  I have asked him to go to the hospital on the way out in order to obtain a PA and lateral chest x-ray that I can view.  We reviewed that smoking cigarettes or marijuana as well as vaping will increase his risk of recurrent pneumothorax.  He understands that he has an approximate lifetime risk of 25% for recurrence on this side.  He knows to go to emergency department if he develops shortness of breath and chest pain on the right.  And if he develops a second pneumothorax on the same side we would recommend bleb resection and pleurodesis.  He knows that he can call the office at anytime with questions or concerns and we will see him back on an as-needed basis.             Thoracic History   Diagnosis: Right spontaneous pneumothorax  Procedures/Surgeries: Chest tube placement    No problems updated.     History of Present Illness   HPI  aJyesh Hatfield Jr. is a 20 y.o. male who returns to the office today for posthospital follow-up.  He developed a primary right spontaneous pneumothorax on 2025 and had a chest tube placed in the emergency department.  This resolved his pneumothorax.  The chest tube was removed on  and he was discharged home with instructions to come back to the clinic after obtaining a PA and lateral chest x-ray.  The patient did not obtain a PA and lateral chest x-ray prior to his visit.  Overall, since discharge he has been doing well.  He denies shortness of breath.  He denies right-sided chest pain.  He has been working as an [employer] .    Review of Systems        Objective   /76 (Patient Position: Sitting, Cuff Size: Standard)   Pulse 96   Temp 98 °F (36.7 °C)  "(Temporal)   Resp 16   Ht 5' 1\" (1.549 m)   Wt 47.6 kg (104 lb 15 oz)   SpO2 98%   BMI 19.83 kg/m²     Pain Screening:     ECOG ECOG Performance Status: 0 - Fully active, able to carry on all pre-disease performance without restriction  Physical Exam  Vitals reviewed.   Constitutional:       General: He is not in acute distress.     Appearance: Normal appearance. He is well-developed.   HENT:      Head: Normocephalic and atraumatic.   Eyes:      General: No scleral icterus.     Conjunctiva/sclera: Conjunctivae normal.      Pupils: Pupils are equal, round, and reactive to light.   Neck:      Trachea: No tracheal deviation.   Cardiovascular:      Rate and Rhythm: Normal rate and regular rhythm.      Heart sounds: Normal heart sounds.   Pulmonary:      Effort: Pulmonary effort is normal. No respiratory distress.      Breath sounds: Normal breath sounds. No wheezing or rales.      Comments: Chest tube insertion site is well-healed without erythema or fluctuance.  Abdominal:      General: Bowel sounds are normal. There is no distension.      Palpations: Abdomen is soft.      Tenderness: There is no abdominal tenderness.   Musculoskeletal:      Cervical back: Normal range of motion and neck supple.      Right lower leg: No edema.      Left lower leg: No edema.   Lymphadenopathy:      Cervical: No cervical adenopathy.   Skin:     General: Skin is warm and dry.   Neurological:      Mental Status: He is alert and oriented to person, place, and time.      Cranial Nerves: No cranial nerve deficit.   Psychiatric:         Behavior: Behavior normal.         Thought Content: Thought content normal.         Judgment: Judgment normal.         Labs:       Pathology: I have reviewed pathology reports described above.      "

## 2025-03-04 NOTE — TELEPHONE ENCOUNTER
Patient calling in, he stated he needs a work note, stating he has no restrictions to go back to work, upload to [email or web address]. Please call him back at [phone] if there are any questions

## 2025-03-05 ENCOUNTER — TELEPHONE (OUTPATIENT)
Age: 20
End: 2025-03-05

## 2025-03-05 NOTE — TELEPHONE ENCOUNTER
Pt calling in requesting a letter to return to work with no restrictions. Pt would like to go back to work tomorrow. Letter can be sent via my chart.  Thank you.

## 2025-03-06 ENCOUNTER — TELEPHONE (OUTPATIENT)
Dept: CARDIAC SURGERY | Facility: CLINIC | Age: 20
End: 2025-03-06

## 2025-03-06 DIAGNOSIS — J93.83 SPONTANEOUS PNEUMOTHORAX: Primary | ICD-10-CM

## 2025-03-06 NOTE — TELEPHONE ENCOUNTER
Patient returned call. Reviewed message from Michelle NIETO with patient. Patient denies chest pain, SOB, cough, or any other symptoms. He is agreeable to repeating CXR in 2 weeks and then seeing Dr. Washington.

## 2025-03-06 NOTE — TELEPHONE ENCOUNTER
"----- Message -----  From: Amylyn Jena Mortimer, PA-C  Sent: 3/6/2025   1:08 PM EST  To: Pamela Yusuf, RN; Jenise Bean RN  Subject: call pt with CXR results and plan                Hello   Please call Jayesh to let him know CXR shows moderate PTX and see if he's still asymptomatic. If so please inform him that DR. Washington would liek to see him back in two weeks with a CXR 1-3 days PRIOR to the visit to ensure that this PTX is improved. Please schedule appt. If he has any new SOB, CP, he should go to [facility].    Thanks  [place]    3/6/25 at 2pm left message on cell phone voicemail to review chest x-ray results and check in with patient will attempt my chart message.     3/6/25-249pm- re-attempted to contact patient he answered phone and stated he was on his was to \"break and will call back in 5 mins\"  "

## 2025-03-07 ENCOUNTER — TELEPHONE (OUTPATIENT)
Dept: HEMATOLOGY ONCOLOGY | Facility: CLINIC | Age: 20
End: 2025-03-07

## 2025-03-07 NOTE — TELEPHONE ENCOUNTER
Message sent to clerical staff to call patient with an appointment on 3/18/25 at 3:20. Please remind patient to have Cxr completed 1-3 days prior to appointment.

## 2025-03-07 NOTE — TELEPHONE ENCOUNTER
Called PT LVM to let him know about appt scheduled with Dr. Washington for 3/18 at 3:20PM.  I have also let the PT know to have xray complete 2-3 days prior to appt.  I have also left hopeline number to call back if he has any questions.

## 2025-03-11 ENCOUNTER — TELEPHONE (OUTPATIENT)
Dept: HEMATOLOGY ONCOLOGY | Facility: CLINIC | Age: 20
End: 2025-03-11

## 2025-03-11 NOTE — TELEPHONE ENCOUNTER
Called PT to attempt to reschedule FU for 3/18.  Pt will not be available on 3/18. He will be on vacation that week.  He was originally scheduled on 3/18 but he rescheduled to the 1st of April on his own.  I let  him know that we will keep the 4/1 date and that we will reach out to him if it differs.  He leaves this weekend on vacation.

## 2025-03-28 ENCOUNTER — TELEPHONE (OUTPATIENT)
Dept: CARDIAC SURGERY | Facility: CLINIC | Age: 20
End: 2025-03-28

## 2025-03-28 NOTE — TELEPHONE ENCOUNTER
I called and spoke with the pt in regards to his follow-up appointment with Dr. Washington. I informed the pt that he will need to get a CXR prior to this appointment. The pt verbalized understanding and was appreciative of the call.

## 2025-04-01 ENCOUNTER — HOSPITAL ENCOUNTER (OUTPATIENT)
Dept: RADIOLOGY | Facility: HOSPITAL | Age: 20
Discharge: HOME | End: 2025-04-01
Payer: COMMERCIAL

## 2025-04-01 ENCOUNTER — OFFICE VISIT (OUTPATIENT)
Dept: CARDIAC SURGERY | Facility: CLINIC | Age: 20
End: 2025-04-01
Payer: COMMERCIAL

## 2025-04-01 VITALS
WEIGHT: 105.16 LBS | HEIGHT: 61 IN | RESPIRATION RATE: 16 BRPM | BODY MASS INDEX: 19.85 KG/M2 | DIASTOLIC BLOOD PRESSURE: 74 MMHG | SYSTOLIC BLOOD PRESSURE: 106 MMHG | OXYGEN SATURATION: 98 % | TEMPERATURE: 98.5 F | HEART RATE: 88 BPM

## 2025-04-01 DIAGNOSIS — J93.83 SPONTANEOUS PNEUMOTHORAX: Primary | ICD-10-CM

## 2025-04-01 DIAGNOSIS — J93.83 SPONTANEOUS PNEUMOTHORAX: ICD-10-CM

## 2025-04-01 PROCEDURE — 99213 OFFICE O/P EST LOW 20 MIN: CPT | Performed by: THORACIC SURGERY (CARDIOTHORACIC VASCULAR SURGERY)

## 2025-04-01 PROCEDURE — 71046 X-RAY EXAM CHEST 2 VIEWS: CPT

## 2025-04-01 NOTE — ASSESSMENT & PLAN NOTE
Mr. Hatfield seems to be doing well.  His PA and lateral chest x-ray today demonstrates complete resolution of his pneumothorax.  We reviewed that smoking cigarettes or marijuana as well as vaping will increase his risk of recurrent pneumothorax.  He understands that he has an approximate lifetime risk of 25% for recurrence on this side.  He knows to go to emergency department if he develops shortness of breath and chest pain on the right.  And if he develops a second pneumothorax on the same side we would recommend bleb resection and pleurodesis.  He knows that he can call the office at anytime with questions or concerns and we will see him back on an as-needed basis

## 2025-04-01 NOTE — PROGRESS NOTES
Name: Jayesh Hatfield Jr.      : 2005      MRN: [id number]  Encounter Provider: Esequiel Washington MD  Encounter Date: 2025   Encounter department: [facility]  :  Assessment & Plan  Spontaneous pneumothorax  Mr. Hatfield seems to be doing well.  His PA and lateral chest x-ray today demonstrates complete resolution of his pneumothorax.  We reviewed that smoking cigarettes or marijuana as well as vaping will increase his risk of recurrent pneumothorax.  He understands that he has an approximate lifetime risk of 25% for recurrence on this side.  He knows to go to emergency department if he develops shortness of breath and chest pain on the right.  And if he develops a second pneumothorax on the same side we would recommend bleb resection and pleurodesis.  He knows that he can call the office at anytime with questions or concerns and we will see him back on an as-needed basis            Thoracic History   Diagnosis: Right primary spontaneous pneumothorax  Procedures/Surgeries: Treated with chest tube    Problem   Spontaneous Pneumothorax        History of Present Illness   HPI  Jayesh Hatfield Jr. is a 20 y.o. male who returns to the office today for posthospital follow-up. He developed a primary right spontaneous pneumothorax on 2025 and had a chest tube placed in the emergency department. This resolved his pneumothorax. The chest tube was removed on  and he was discharged home.  I saw him back in the office on 2025 where he was clinically doing well.  He obtained a PA and lateral chest x-ray after that appointment and it demonstrated a slight increase in his pneumothorax.     He underwent a PA and lateral chest x-ray today and this is personally reviewed.  The x-ray demonstrates complete resolution of the pneumothorax.  There is no consolidation or pleural effusion.    He has no complaints.  He denies shortness of breath, chest pain, cough,  "purulent sputum production, or hemoptysis.    Review of Systems   Constitutional:  Negative for activity change, chills and fever.   Respiratory:  Negative for cough, chest tightness and shortness of breath.    Cardiovascular:  Negative for chest pain.           Objective   /74 (Patient Position: Sitting, Cuff Size: Standard)   Temp 98.5 °F (36.9 °C) (Temporal)   Resp 16   Ht 5' 1\" (1.549 m)   Wt 47.7 kg (105 lb 2.6 oz)   SpO2 98%   BMI 19.87 kg/m²     Pain Screening:     ECOG ECOG Performance Status: 0 - Fully active, able to carry on all pre-disease performance without restriction  Physical Exam  Vitals reviewed.   Constitutional:       General: He is not in acute distress.     Appearance: Normal appearance. He is well-developed.   HENT:      Head: Normocephalic and atraumatic.   Eyes:      General: No scleral icterus.     Conjunctiva/sclera: Conjunctivae normal.      Pupils: Pupils are equal, round, and reactive to light.   Neck:      Trachea: No tracheal deviation.   Cardiovascular:      Rate and Rhythm: Normal rate and regular rhythm.      Heart sounds: Normal heart sounds.   Pulmonary:      Effort: Pulmonary effort is normal. No respiratory distress.      Breath sounds: Normal breath sounds. No wheezing or rales.   Abdominal:      General: Bowel sounds are normal. There is no distension.      Palpations: Abdomen is soft.      Tenderness: There is no abdominal tenderness.   Musculoskeletal:      Cervical back: Normal range of motion and neck supple.      Right lower leg: No edema.      Left lower leg: No edema.   Lymphadenopathy:      Cervical: No cervical adenopathy.   Skin:     General: Skin is warm and dry.   Neurological:      Mental Status: He is alert and oriented to person, place, and time.      Cranial Nerves: No cranial nerve deficit.   Psychiatric:         Behavior: Behavior normal.         Thought Content: Thought content normal.         Judgment: Judgment normal.         Labs: "     Radiology Results Review : I have reviewed images/report studies in PACS as described above (in the HPI).  Pathology: I have reviewed pathology reports described above.